# Patient Record
Sex: FEMALE | Race: WHITE | Employment: FULL TIME | URBAN - METROPOLITAN AREA
[De-identification: names, ages, dates, MRNs, and addresses within clinical notes are randomized per-mention and may not be internally consistent; named-entity substitution may affect disease eponyms.]

---

## 2019-08-30 ENCOUNTER — HOSPITAL ENCOUNTER (OUTPATIENT)
Dept: CT IMAGING | Age: 54
Discharge: HOME | End: 2019-08-30
Attending: ORTHOPAEDIC SURGERY
Payer: COMMERCIAL

## 2019-08-30 DIAGNOSIS — S69.91XA RIGHT WRIST INJURY, INITIAL ENCOUNTER: ICD-10-CM

## 2019-08-30 PROCEDURE — 73200 CT UPPER EXTREMITY W/O DYE: CPT

## 2019-12-06 ENCOUNTER — HOSPITAL ENCOUNTER (OUTPATIENT)
Dept: CT IMAGING | Age: 54
Discharge: HOME | End: 2019-12-06
Attending: NURSE PRACTITIONER
Payer: COMMERCIAL

## 2019-12-06 DIAGNOSIS — R93.89 ABNORMAL CXR: ICD-10-CM

## 2019-12-06 PROCEDURE — 71250 CT THORAX DX C-: CPT

## 2020-08-26 ENCOUNTER — OFFICE VISIT (OUTPATIENT)
Dept: CARDIOLOGY CLINIC | Age: 55
End: 2020-08-26
Payer: COMMERCIAL

## 2020-08-26 ENCOUNTER — CLINICAL SUPPORT (OUTPATIENT)
Dept: CARDIOLOGY CLINIC | Age: 55
End: 2020-08-26
Payer: COMMERCIAL

## 2020-08-26 VITALS
BODY MASS INDEX: 32.4 KG/M2 | DIASTOLIC BLOOD PRESSURE: 70 MMHG | HEART RATE: 101 BPM | RESPIRATION RATE: 18 BRPM | HEIGHT: 66 IN | WEIGHT: 201.6 LBS | OXYGEN SATURATION: 98 % | SYSTOLIC BLOOD PRESSURE: 114 MMHG

## 2020-08-26 DIAGNOSIS — I49.8 FLUTTERING HEART: ICD-10-CM

## 2020-08-26 DIAGNOSIS — R00.2 PALPITATIONS: ICD-10-CM

## 2020-08-26 DIAGNOSIS — R06.02 SOB (SHORTNESS OF BREATH): ICD-10-CM

## 2020-08-26 DIAGNOSIS — R00.2 PALPITATIONS: Primary | ICD-10-CM

## 2020-08-26 DIAGNOSIS — R07.9 CHEST PAIN, UNSPECIFIED TYPE: ICD-10-CM

## 2020-08-26 PROCEDURE — 93224 XTRNL ECG REC UP TO 48 HRS: CPT | Performed by: INTERNAL MEDICINE

## 2020-08-26 PROCEDURE — 99204 OFFICE O/P NEW MOD 45 MIN: CPT | Performed by: INTERNAL MEDICINE

## 2020-08-26 PROCEDURE — 93000 ELECTROCARDIOGRAM COMPLETE: CPT | Performed by: INTERNAL MEDICINE

## 2020-08-26 RX ORDER — IBUPROFEN 200 MG
800 TABLET ORAL AS NEEDED
COMMUNITY
End: 2021-08-03 | Stop reason: DRUGHIGH

## 2020-08-26 RX ORDER — ETANERCEPT 50 MG/ML
50 SOLUTION SUBCUTANEOUS
COMMUNITY
Start: 2020-04-24

## 2020-08-26 NOTE — PROGRESS NOTES
[address]  [phone]     Subjective:      Frankey Hoffman is a 62 y.o. female with pmhx RA is here to establish care/referred by pcp for:  C/o daily episodes of palpitation x 3 weeks associated with some sob. She reports intermittent nonexertional cp but last episode was in  11/19. She walks every night for 30 minutes  Around her neighborhood with no cp but does notice heart fluttering. Family hx CAD (maternal side). The patient denies  orthopnea, PND, LE edema, syncope, or presyncope.        Patient Active Problem List    Diagnosis Date Noted    DJD (degenerative joint disease) of knee 05/10/2011      Brayan Lazcano NP  Past Medical History:   Diagnosis Date    ostearthritis     RHA (rheumatoid arthritis) [facility])       Past Surgical History:   Procedure Laterality Date    HX APPENDECTOMY      HX CHOLECYSTECTOMY      1 year ago    HX HYSTERECTOMY      HX ORTHOPAEDIC      bi-lateral pins to knees    HX TONSILLECTOMY      TOTAL KNEE ARTHROPLASTY  5/9/2011    Right     Allergies   Allergen Reactions    Morphine Shortness of Breath      Family History   Problem Relation Age of Onset    Other Mother         tachycardia    Stroke Father     Heart Failure Father 80    Microhematuria Maternal Grandfather 64      Social History     Socioeconomic History    Marital status: SINGLE     Spouse name: Not on file    Number of children: Not on file    Years of education: Not on file    Highest education level: Not on file   Occupational History    Not on file   Social Needs    Financial resource strain: Not on file    Food insecurity     Worry: Not on file     Inability: Not on file    Transportation needs     Medical: Not on file     Non-medical: Not on file   Tobacco Use    Smoking status: Never Smoker    Smokeless tobacco: Never Used   Substance and Sexual Activity    Alcohol use: Yes     Comment: occasionally    Drug use: Not Currently     Types: Prescription, OTC    Sexual activity: Not on file   Lifestyle    Physical activity     Days per week: Not on file     Minutes per session: Not on file    Stress: Not on file   Relationships    Social connections     Talks on phone: Not on file     Gets together: Not on file     Attends Rastafarian service: Not on file     Active member of club or organization: Not on file     Attends meetings of clubs or organizations: Not on file     Relationship status: Not on file    Intimate partner violence     Fear of current or ex partner: Not on file     Emotionally abused: Not on file     Physically abused: Not on file     Forced sexual activity: Not on file   Other Topics Concern    Not on file   Social History Narrative    Not on file      Current Outpatient Medications   Medication Sig    etanercept (EnbreL SureClick) 50 mg/mL (1 mL) injection 50 mg every seven (7) days.  ibuprofen (MOTRIN) 200 mg tablet Take 200 mg by mouth every six (6) hours as needed for Pain.  cyclobenzaprine (FLEXERIL) 10 mg tablet Take 10 mg by mouth nightly. No current facility-administered medications for this visit. Review of Symptoms:  11 systems reviewed, negative other than as stated in the HPI    Physical ExamPhysical Exam:    Vitals:    08/26/20 1434 08/26/20 1435   BP: 100/70 114/70   Pulse: (!) 101    Resp: 18    SpO2: 98%    Weight: 201 lb 9.6 oz (91.4 kg)    Height: 5' 6\" (1.676 m)      Body mass index is 32.54 kg/m². General PE  Gen:  NAD  Mental Status - Alert. General Appearance - Not in acute distress. HEENT:  PERRL, no carotid bruits or JVD  Chest and Lung Exam   Inspection: Accessory muscles - No use of accessory muscles in breathing. Auscultation:   Breath sounds: - Normal.   Cardiovascular   Inspection: Jugular vein - Bilateral - Inspection Normal.   Palpation/Percussion:   Apical Impulse: - Normal.   Auscultation: Rhythm - Regular. Heart Sounds - S1 WNL and S2 WNL.  No S3 or S4.   Murmurs & Other Heart Sounds: Auscultation of the heart reveals - No Murmurs. Peripheral Vascular   Upper Extremity: Inspection - Bilateral - No Cyanotic nailbeds or Digital clubbing. Lower Extremity:   Palpation: Edema - Bilateral - No edema. Abdomen:   Soft, non-tender, bowel sounds are active. Neuro: A&O times 3, CN and motor grossly WNL    Labs:   No results found for: CHOL, CHOLX, CHLST, CHOLV, 788532, HDL, HDLP, LDL, LDLC, DLDLP, TGLX, TRIGL, TRIGP, CHHD, CHHDX  No results found for: CPK, CPKX, CPX  Lab Results   Component Value Date/Time    Sodium 138 05/10/2011 03:30 AM    Potassium 3.8 05/10/2011 03:30 AM    Chloride 107 05/10/2011 03:30 AM    CO2 26 05/10/2011 03:30 AM    Anion gap 5 05/10/2011 03:30 AM    Glucose 169 (H) 05/10/2011 03:30 AM    BUN 10 05/10/2011 03:30 AM    Creatinine 0.8 05/10/2011 03:30 AM    BUN/Creatinine ratio 13 05/10/2011 03:30 AM    GFR est AA >60 05/10/2011 03:30 AM    GFR est non-AA >60 05/10/2011 03:30 AM    Calcium 7.6 (L) 05/10/2011 03:30 AM    Bilirubin, total 0.3 2010 09:40 AM    Alk. phosphatase 79 2010 09:40 AM    Protein, total 6.9 2010 09:40 AM    Albumin 3.3 (L) 2010 09:40 AM    Globulin 3.6 2010 09:40 AM    A-G Ratio 0.9 (L) 2010 09:40 AM    ALT (SGPT) 49 2010 09:40 AM       EKG:  NSR      Assessment:     Assessment:      1. Palpitations    2. SOB (shortness of breath)    3. Fluttering heart        Orders Placed This Encounter    AMB POC EKG ROUTINE W/ 12 LEADS, INTER & REP     Order Specific Question:   Reason for Exam:     Answer:   routine    etanercept (EnbreL SureClick) 50 mg/mL (1 mL) injection     Si mg every seven (7) days.  ibuprofen (MOTRIN) 200 mg tablet     Sig: Take 200 mg by mouth every six (6) hours as needed for Pain.         Plan:       Palpitation with some sob when it occurs  Normal TSH    Obtain 24 hr holter, echo    Bp controlled    Hx atypical cp last episode was in , occurred while sitting down and lasted about 10 minutes and went away  Check Stress ekg   Due to risk factors of age, and rheumatologic disease, if stress test normal, Coronary calcium scoring would be reassuring if totally normal and threshold for further testing/treatment would be decreased if high- the patient wishes to proceed (he will call [address] [place] [address] to schedule) and will call for my input on results after testing is completed. RA  On biologic therapy, followed by Dr Kiki Jaimes      Follow-up to be determined based on test results.          Ana Abrams MD

## 2020-08-26 NOTE — LETTER
8/26/20 Patient: Viola Chavez YOB: 1965 Date of Visit: 8/26/2020 Sharmin Galo NP 
[facility] [address] [address] VIA Facsimile: [phone] Dear Sharmin Galo NP, Thank you for referring Ms. Irving Chadwick to [facility] for evaluation. My notes for this consultation are attached. If you have questions, please do not hesitate to call me. I look forward to following your patient along with you.  
 
 
Sincerely, 
 
Rut Grant MD

## 2020-08-26 NOTE — PROGRESS NOTES
Chief Complaint   Patient presents with    New Patient     Referred by pcp for palpitations     Chest Pain     started in 2/20 3 diff times - has not had since     Irregular Heart Beat     has been having flutters for the past 2 weeks - Dad had CHF and Mom has tachycardia     Shortness of Breath     with flutters     Leg Swelling     left lower leg - does have issue with left knee      1. Have you been to the ER, urgent care clinic since your last visit? Hospitalized since your last visit? No     2. Have you seen or consulted any other health care providers outside of the [address] [place] [address] since your last visit? Include any pap smears or colon screening.   No

## 2020-08-27 DIAGNOSIS — R00.2 PALPITATIONS: ICD-10-CM

## 2020-08-27 DIAGNOSIS — R06.02 SOB (SHORTNESS OF BREATH): ICD-10-CM

## 2020-08-27 DIAGNOSIS — I49.8 FLUTTERING HEART: ICD-10-CM

## 2020-08-28 RX ORDER — METOPROLOL SUCCINATE 25 MG/1
12.5 TABLET, EXTENDED RELEASE ORAL DAILY
Qty: 60 TAB | Refills: 1 | Status: SHIPPED | OUTPATIENT
Start: 2020-08-28 | End: 2021-02-07

## 2020-08-28 NOTE — TELEPHONE ENCOUNTER
Verified patient with two identifiers. Pt informed that holter monitor findings revealed some A-fib with rapid rates. She understands what A-fib is. Due to this finding Rayshawn Lau, NP would like her to start Toprol 12.5 mg . She will take this at bedtime. Monitor BP and symptoms. She will keep her echo and stress appts and follow up with Dr Phillip Gudino in 1 month. Pt verbalized understanding.

## 2020-08-31 ENCOUNTER — HOSPITAL ENCOUNTER (OUTPATIENT)
Dept: PREADMISSION TESTING | Age: 55
Discharge: HOME | End: 2020-08-31
Payer: COMMERCIAL

## 2020-08-31 PROCEDURE — 87635 SARS-COV-2 COVID-19 AMP PRB: CPT

## 2020-09-01 LAB
HEALTH STATUS, XMCV2T: NORMAL
SARS-COV-2, COV2NT: NOT DETECTED
SOURCE, COVRS: NORMAL
SPECIMEN SOURCE, FCOV2M: NORMAL
SPECIMEN TYPE, XMCV1T: NORMAL

## 2020-09-03 ENCOUNTER — TELEPHONE (OUTPATIENT)
Dept: CARDIOLOGY CLINIC | Age: 55
End: 2020-09-03

## 2020-09-03 NOTE — TELEPHONE ENCOUNTER
Dario Leavitt- noted chads vascular score is 1. Please advise to also take aspirin 81 mg daily, added to her medication list once she confirms this. If blood pressure is above 95 and she is not lightheaded, she can also try up to 25 mg of Toprol. Follow-up as scheduled.

## 2020-09-04 ENCOUNTER — HOSPITAL ENCOUNTER (OUTPATIENT)
Dept: NON INVASIVE DIAGNOSTICS | Age: 55
Discharge: HOME | End: 2020-09-04
Attending: INTERNAL MEDICINE

## 2020-09-04 ENCOUNTER — TELEPHONE (OUTPATIENT)
Dept: CARDIOLOGY CLINIC | Age: 55
End: 2020-09-04

## 2020-09-04 DIAGNOSIS — E78.2 MIXED HYPERLIPIDEMIA: ICD-10-CM

## 2020-09-04 DIAGNOSIS — E78.2 MIXED HYPERLIPIDEMIA: Primary | ICD-10-CM

## 2020-09-04 DIAGNOSIS — R06.09 DOE (DYSPNEA ON EXERTION): ICD-10-CM

## 2020-09-04 LAB
STRESS ANGINA INDEX: 0
STRESS BASELINE DIAS BP: 74 MMHG
STRESS BASELINE HR: 80 BPM
STRESS BASELINE SYS BP: 108 MMHG
STRESS ESTIMATED WORKLOAD: 7 METS
STRESS EXERCISE DUR MIN: NORMAL
STRESS O2 SAT PEAK: 98 %
STRESS O2 SAT REST: 96 %
STRESS PEAK DIAS BP: 76 MMHG
STRESS PEAK SYS BP: 140 MMHG
STRESS PERCENT HR ACHIEVED: 93 %
STRESS POST PEAK HR: 153 BPM
STRESS RATE PRESSURE PRODUCT: NORMAL BPM*MMHG
STRESS SR DUKE TREADMILL SCORE: 0
STRESS ST DEPRESSION: 0 MM
STRESS ST ELEVATION: 0 MM
STRESS TARGET HR: 164 BPM

## 2020-09-04 RX ORDER — BISMUTH SUBSALICYLATE 262 MG
1 TABLET,CHEWABLE ORAL DAILY
COMMUNITY
End: 2021-08-03

## 2020-09-04 RX ORDER — MELATONIN 5 MG
5 CAPSULE ORAL
COMMUNITY

## 2020-09-04 NOTE — TELEPHONE ENCOUNTER
----- Message from Tanvi Villasenor NP sent at 9/4/2020  1:28 PM EDT -----  Stress test is normal. May proceed with CAC score if she wants to.  I will order if in agreement

## 2020-09-11 ENCOUNTER — TELEPHONE (OUTPATIENT)
Dept: CARDIOLOGY CLINIC | Age: 55
End: 2020-09-11

## 2020-09-16 ENCOUNTER — ANCILLARY PROCEDURE (OUTPATIENT)
Dept: CARDIOLOGY CLINIC | Age: 55
End: 2020-09-16
Payer: COMMERCIAL

## 2020-09-16 VITALS
DIASTOLIC BLOOD PRESSURE: 70 MMHG | BODY MASS INDEX: 32.3 KG/M2 | SYSTOLIC BLOOD PRESSURE: 114 MMHG | WEIGHT: 201 LBS | HEIGHT: 66 IN

## 2020-09-16 PROCEDURE — 93306 TTE W/DOPPLER COMPLETE: CPT | Performed by: INTERNAL MEDICINE

## 2020-09-17 LAB
ECHO AO ASC DIAM: 2.82 CM
ECHO AO ROOT DIAM: 2.99 CM
ECHO AV AREA PEAK VELOCITY: 1.99 CM2
ECHO AV AREA PEAK VELOCITY: 2.06 CM2
ECHO AV PEAK GRADIENT: 4.98 MMHG
ECHO AV PEAK VELOCITY: 111.53 CM/S
ECHO LA AREA 4C: 18.27 CM2
ECHO LA MAJOR AXIS: 2.82 CM
ECHO LA MINOR AXIS: 1.41 CM
ECHO LA VOL 2C: 46.91 ML (ref 22–52)
ECHO LA VOL 4C: 48.98 ML (ref 22–52)
ECHO LA VOL BP: 51.02 ML (ref 22–52)
ECHO LA VOL/BSA BIPLANE: 25.45 ML/M2 (ref 16–28)
ECHO LA VOLUME INDEX A2C: 23.4 ML/M2 (ref 16–28)
ECHO LA VOLUME INDEX A4C: 24.44 ML/M2 (ref 16–28)
ECHO LV E' LATERAL VELOCITY: 9.76 CM/S
ECHO LV E' SEPTAL VELOCITY: 7.19 CM/S
ECHO LV INTERNAL DIMENSION DIASTOLIC: 5.12 CM (ref 3.9–5.3)
ECHO LV INTERNAL DIMENSION SYSTOLIC: 3.61 CM
ECHO LV IVSD: 0.9 CM (ref 0.6–0.9)
ECHO LV MASS 2D: 169.4 G (ref 67–162)
ECHO LV MASS INDEX 2D: 84.5 G/M2 (ref 43–95)
ECHO LV POSTERIOR WALL DIASTOLIC: 0.94 CM (ref 0.6–0.9)
ECHO LVOT DIAM: 2.03 CM
ECHO LVOT PEAK GRADIENT: 1.87 MMHG
ECHO LVOT PEAK GRADIENT: 2.01 MMHG
ECHO LVOT PEAK VELOCITY: 68.44 CM/S
ECHO LVOT PEAK VELOCITY: 70.95 CM/S
ECHO LVOT SV: 59.1 ML
ECHO LVOT VTI: 18.25 CM
ECHO MV A VELOCITY: 60.61 CM/S
ECHO MV AREA PHT: 4.12 CM2
ECHO MV E DECELERATION TIME (DT): 0.18 S
ECHO MV E VELOCITY: 80.76 CM/S
ECHO MV E/A RATIO: 1.33
ECHO MV E/E' LATERAL: 8.27
ECHO MV E/E' RATIO (AVERAGED): 9.75
ECHO MV E/E' SEPTAL: 11.23
ECHO MV PRESSURE HALF TIME (PHT): 0.05 S
ECHO RV TAPSE: 2.05 CM (ref 1.5–2)

## 2020-09-21 NOTE — TELEPHONE ENCOUNTER
----- Message from Monica Long NP sent at 9/21/2020 11:41 AM EDT -----  Normal squeezing function of the heart, mild leakage in mitral valve. Continue good BP control.

## 2020-09-30 ENCOUNTER — OFFICE VISIT (OUTPATIENT)
Dept: CARDIOLOGY CLINIC | Age: 55
End: 2020-09-30
Payer: COMMERCIAL

## 2020-09-30 VITALS
DIASTOLIC BLOOD PRESSURE: 74 MMHG | RESPIRATION RATE: 16 BRPM | OXYGEN SATURATION: 100 % | WEIGHT: 194.9 LBS | HEART RATE: 60 BPM | BODY MASS INDEX: 31.32 KG/M2 | HEIGHT: 66 IN | SYSTOLIC BLOOD PRESSURE: 110 MMHG

## 2020-09-30 DIAGNOSIS — I48.0 PAF (PAROXYSMAL ATRIAL FIBRILLATION) (HCC): Primary | ICD-10-CM

## 2020-09-30 DIAGNOSIS — I49.8 FLUTTERING HEART: ICD-10-CM

## 2020-09-30 DIAGNOSIS — R06.02 SOB (SHORTNESS OF BREATH): ICD-10-CM

## 2020-09-30 DIAGNOSIS — R00.2 PALPITATIONS: ICD-10-CM

## 2020-09-30 PROCEDURE — 93000 ELECTROCARDIOGRAM COMPLETE: CPT | Performed by: INTERNAL MEDICINE

## 2020-09-30 PROCEDURE — 99214 OFFICE O/P EST MOD 30 MIN: CPT | Performed by: INTERNAL MEDICINE

## 2020-09-30 RX ORDER — GUAIFENESIN 100 MG/5ML
81 LIQUID (ML) ORAL DAILY
COMMUNITY
End: 2021-08-03

## 2020-09-30 NOTE — LETTER
9/30/20 Patient: Fabby Reid YOB: 1965 Date of Visit: 9/30/2020 Beth Cardoza NP 
[facility] [address] [address] VIA Facsimile: [phone] Dear Beth Cardoza NP, Thank you for referring Ms. Darius Coulter to [address] [place] [address] [facility] for evaluation. My notes for this consultation are attached. If you have questions, please do not hesitate to call me. I look forward to following your patient along with you.  
 
 
Sincerely, 
 
Valente Hilton MD

## 2020-09-30 NOTE — PROGRESS NOTES
1. Have you been to the ER, urgent care clinic since your last visit? Hospitalized since your last visit? No.    2. Have you seen or consulted any other health care providers outside of the [address] [place] [address] since your last visit? Include any pap smears or colon screening.    No.      Chief Complaint   Patient presents with    Follow-up     1 month- some heart palps, sob is better    Results     discuss test results

## 2020-09-30 NOTE — PROGRESS NOTES
[address]  [phone]     Subjective:      Arin Mathis is a 62 y.o. female is here for one mos f/u. Newly diagnosed with PAF, seen per holter monitor we ordered last month. Started on ASA and BB. Today, feels great. No further palpitation / sob / cp. Other cardiac work up -ve. The patient denies chest pain/ shortness of breath, orthopnea, PND, LE edema, palpitations, syncope, or presyncope.        Patient Active Problem List    Diagnosis Date Noted    DJD (degenerative joint disease) of knee 05/10/2011      Yue Mcnamara NP  Past Medical History:   Diagnosis Date    ostearthritis     [facility]umatoid [facility]) [facility])       Past Surgical History:   Procedure Laterality Date    HX APPENDECTOMY      HX CHOLECYSTECTOMY      1 year ago    HX HYSTERECTOMY      HX ORTHOPAEDIC      bi-lateral pins to knees    HX TONSILLECTOMY      TOTAL KNEE ARTHROPLASTY  5/9/2011    Right     Allergies   Allergen Reactions    Morphine Shortness of Breath      Family History   Problem Relation Age of Onset    Other Mother         tachycardia    Stroke Father     Heart Failure Father 80    Microhematuria Maternal Grandfather 64      Social History     Socioeconomic History    Marital status: SINGLE     Spouse name: Not on file    Number of children: Not on file    Years of education: Not on file    Highest education level: Not on file   Occupational History    Not on file   Social Needs    Financial resource strain: Not on file    Food insecurity     Worry: Not on file     Inability: Not on file    Transportation needs     Medical: Not on file     Non-medical: Not on file   Tobacco Use    Smoking status: Never Smoker    Smokeless tobacco: Never Used   Substance and Sexual Activity    Alcohol use: Yes     Comment: occasionally    Drug use: Not Currently     Types: Prescription, OTC    Sexual activity: Not on file   Lifestyle    Physical activity     Days per week: Not on file     Minutes per session: Not on file    Stress: Not on file   Relationships    Social connections     Talks on phone: Not on file     Gets together: Not on file     Attends Moravian service: Not on file     Active member of club or organization: Not on file     Attends meetings of clubs or organizations: Not on file     Relationship status: Not on file    Intimate partner violence     Fear of current or ex partner: Not on file     Emotionally abused: Not on file     Physically abused: Not on file     Forced sexual activity: Not on file   Other Topics Concern    Not on file   Social History Narrative    Not on file      Current Outpatient Medications   Medication Sig    aspirin 81 mg chewable tablet Take 81 mg by mouth daily.  OTHER,NON-FORMULARY, Take 100 mg by mouth daily. Potassium 100mg daily.  multivitamin (ONE A DAY) tablet Take 1 Tab by mouth daily.  OTHER,NON-FORMULARY, Take 1 Tab by mouth daily. Omega 3 1 tablet daily    OTHER,NON-FORMULARY, Take 1 Tab by mouth daily. Calcium Magnesium tablet daily.  melatonin 5 mg cap capsule Take 5 mg by mouth nightly as needed (for sleep aid).  metoprolol succinate (TOPROL-XL) 25 mg XL tablet Take 0.5 Tabs by mouth daily.  etanercept (EnbreL SureClick) 50 mg/mL (1 mL) injection 50 mg every seven (7) days.  ibuprofen (MOTRIN) 200 mg tablet Take 200 mg by mouth every six (6) hours as needed for Pain.  cyclobenzaprine (FLEXERIL) 10 mg tablet Take 10 mg by mouth nightly. No current facility-administered medications for this visit. Review of Symptoms:  11 systems reviewed, negative other than as stated in the HPI    Physical ExamPhysical Exam:    Vitals:    09/30/20 1505 09/30/20 1510   BP: 128/78 110/74   Pulse: 60    Resp: 16    SpO2: 100%    Weight: 194 lb 14.4 oz (88.4 kg)    Height: 5' 6\" (1.676 m)      Body mass index is 31.46 kg/m². General PE  Gen:  NAD  Mental Status - Alert.  General Appearance - Not in acute distress. HEENT:  PERRL, no carotid bruits or JVD  Chest and Lung Exam   Inspection: Accessory muscles - No use of accessory muscles in breathing. Auscultation:   Breath sounds: - Normal.   Cardiovascular   Inspection: Jugular vein - Bilateral - Inspection Normal.   Palpation/Percussion:   Apical Impulse: - Normal.   Auscultation: Rhythm - Regular. Heart Sounds - S1 WNL and S2 WNL. No S3 or S4. Murmurs & Other Heart Sounds: Auscultation of the heart reveals - No Murmurs. Peripheral Vascular   Upper Extremity: Inspection - Bilateral - No Cyanotic nailbeds or Digital clubbing. Lower Extremity:   Palpation: Edema - Bilateral - No edema. Abdomen:   Soft, non-tender, bowel sounds are active. Neuro: A&O times 3, CN and motor grossly WNL    Labs:   No results found for: CHOL, CHOLX, CHLST, CHOLV, 231805, HDL, HDLP, LDL, LDLC, DLDLP, TGLX, TRIGL, TRIGP, CHHD, CHHDX  No results found for: CPK, CPKX, CPX  Lab Results   Component Value Date/Time    Sodium 138 05/10/2011 03:30 AM    Potassium 3.8 05/10/2011 03:30 AM    Chloride 107 05/10/2011 03:30 AM    CO2 26 05/10/2011 03:30 AM    Anion gap 5 05/10/2011 03:30 AM    Glucose 169 (H) 05/10/2011 03:30 AM    BUN 10 05/10/2011 03:30 AM    Creatinine 0.8 05/10/2011 03:30 AM    BUN/Creatinine ratio 13 05/10/2011 03:30 AM    GFR est AA >60 05/10/2011 03:30 AM    GFR est non-AA >60 05/10/2011 03:30 AM    Calcium 7.6 (L) 05/10/2011 03:30 AM    Bilirubin, total 0.3 03/03/2010 09:40 AM    Alk. phosphatase 79 03/03/2010 09:40 AM    Protein, total 6.9 03/03/2010 09:40 AM    Albumin 3.3 (L) 03/03/2010 09:40 AM    Globulin 3.6 03/03/2010 09:40 AM    A-G Ratio 0.9 (L) 03/03/2010 09:40 AM    ALT (SGPT) 49 03/03/2010 09:40 AM       EKG:  NSR     Assessment:        1. PAF (paroxysmal atrial fibrillation) (HCC)    2. Palpitations    3. SOB (shortness of breath)    4.  Fluttering heart        Orders Placed This Encounter    AMB POC EKG ROUTINE W/ 12 LEADS, INTER & REP     Order Specific Question:   Reason for Exam:     Answer:   routine    aspirin 81 mg chewable tablet     Sig: Take 81 mg by mouth daily. Plan:     PAF, newly diagnosed  24 hr holter 8/2020: Palpitations correspond to runs of paroxysmal atrial fibrillation. Normal TSH 11/19   Normal EF no significant valve issues per echo in 9/2020  Continue low dose BB, ASA    Atrial fibrillation CHADSVASC2 score stroke risk:   62 y.o.                                        <65        + 0   female                                         Female +1  CHF hx                                           No    + 0  HTN hx                                           No    + 0  Stroke/TIA/Thromboembolism       No    +0  Vascular disease hx                       No    + 0  Diabetes Mellitus                            No    + 0   CHADSVASC2 score                    1      Annual Stroke Risk 0.6% - low-moderate risk          Bp controlled     Hx atypical cp last episode was in 11/19, occurred while sitting down and lasted about 10 minutes and went away  Normal stress test 9/2020  Defers CAC score     RA  On biologic therapy, followed by Dr Norah Dotson to establish care with a PCP     Counseled on diet and exercise- eventual goal of 30-60 minutes 5-7 times a week as per AHA guidelines.       Continue current care and f/u in 1 year, sooner SHAYLEE Grant MD

## 2021-05-18 ENCOUNTER — TRANSCRIBE ORDER (OUTPATIENT)
Dept: SCHEDULING | Age: 56
End: 2021-05-18

## 2021-05-18 DIAGNOSIS — M17.12 DEGENERATIVE ARTHRITIS OF LEFT KNEE: Primary | ICD-10-CM

## 2021-06-14 ENCOUNTER — PATIENT MESSAGE (OUTPATIENT)
Dept: CARDIOLOGY CLINIC | Age: 56
End: 2021-06-14

## 2021-06-15 ENCOUNTER — TELEPHONE (OUTPATIENT)
Dept: CARDIOLOGY CLINIC | Age: 56
End: 2021-06-15

## 2021-06-15 RX ORDER — METOPROLOL SUCCINATE 25 MG/1
25 TABLET, EXTENDED RELEASE ORAL DAILY
Qty: 90 TABLET | Refills: 1 | Status: SHIPPED | OUTPATIENT
Start: 2021-06-15 | End: 2021-11-12 | Stop reason: SDUPTHER

## 2021-06-15 NOTE — TELEPHONE ENCOUNTER
Patient needs refill on medication. Medication is metoprolol, at [id number]. Patient needs new prescription showing changes sent over to the pharmacy. [phone].      Thanks,  Griselda Garcia

## 2021-06-29 ENCOUNTER — OFFICE VISIT (OUTPATIENT)
Dept: CARDIOLOGY CLINIC | Age: 56
End: 2021-06-29
Payer: COMMERCIAL

## 2021-06-29 VITALS
RESPIRATION RATE: 18 BRPM | HEART RATE: 79 BPM | OXYGEN SATURATION: 98 % | DIASTOLIC BLOOD PRESSURE: 68 MMHG | WEIGHT: 205 LBS | SYSTOLIC BLOOD PRESSURE: 122 MMHG | BODY MASS INDEX: 32.95 KG/M2 | HEIGHT: 66 IN

## 2021-06-29 DIAGNOSIS — R06.02 SOB (SHORTNESS OF BREATH): ICD-10-CM

## 2021-06-29 DIAGNOSIS — R42 LIGHTHEADEDNESS: ICD-10-CM

## 2021-06-29 DIAGNOSIS — I48.0 PAF (PAROXYSMAL ATRIAL FIBRILLATION) (HCC): Primary | ICD-10-CM

## 2021-06-29 DIAGNOSIS — M06.9 RHEUMATOID ARTHRITIS, INVOLVING UNSPECIFIED SITE, UNSPECIFIED WHETHER RHEUMATOID FACTOR PRESENT (HCC): ICD-10-CM

## 2021-06-29 PROCEDURE — 93000 ELECTROCARDIOGRAM COMPLETE: CPT | Performed by: INTERNAL MEDICINE

## 2021-06-29 PROCEDURE — 99214 OFFICE O/P EST MOD 30 MIN: CPT | Performed by: INTERNAL MEDICINE

## 2021-06-29 RX ORDER — PREDNISONE 5 MG/1
10 TABLET ORAL AS NEEDED
COMMUNITY
Start: 2021-06-17 | End: 2021-11-12

## 2021-06-29 RX ORDER — FOLIC ACID 1 MG/1
1 TABLET ORAL DAILY
COMMUNITY
Start: 2021-02-10

## 2021-06-29 RX ORDER — METHOTREXATE 2.5 MG/1
2.5 TABLET ORAL
COMMUNITY
Start: 2021-06-19

## 2021-06-29 RX ORDER — CLOBETASOL PROPIONATE 0.46 MG/ML
SOLUTION TOPICAL
COMMUNITY
Start: 2021-06-04 | End: 2021-08-03

## 2021-06-29 RX ORDER — LEUCOVORIN CALCIUM 5 MG/1
TABLET ORAL
COMMUNITY
Start: 2021-02-10 | End: 2021-08-03

## 2021-06-29 NOTE — PROGRESS NOTES
1. Have you been to the ER, urgent care clinic since your last visit? Hospitalized since your last visit? No    2. Have you seen or consulted any other health care providers outside of the [address] [place] [address] since your last visit? Include any pap smears or colon screening.  No           Chief Complaint   Patient presents with    Irregular Heart Beat     C/O SOB, Bilateral leg and ankle swelling

## 2021-06-29 NOTE — PROGRESS NOTES
[address] [facility]  [phone]     Subjective:      Eugene Yost is a 60 y.o. female is here for a f/u and to have cardiac clearance for left knee replacement with Dr Micheal Noel in August. Hx of PAF, RA. She reports within the past 2 months she has had worsening MEDEROS, episodes of flip flop heart beat sensations, and feeling lightheaded. She called our office about a month ago and reported these symptoms. We increased her Metoprolol to 25mg daily. Since then the symptoms have improved. No more lightheadedness, palpitations are better. Still having some MEDEROS. She has some swelling primarily in her left leg when up for a long time standing. Thinks it is related to needing her knee surgery. She denies chest pain, orthopnea, PND.     Patient Active Problem List    Diagnosis Date Noted    DJD (degenerative joint disease) of knee 05/10/2011      Ramesh Kidd NP  Past Medical History:   Diagnosis Date    ostearthritis     RHA (rheumatoid arthritis) ([facility] [address]       Past Surgical History:   Procedure Laterality Date    HX APPENDECTOMY      HX CHOLECYSTECTOMY      1 year ago    HX HYSTERECTOMY      HX ORTHOPAEDIC      bi-lateral pins to knees    HX TONSILLECTOMY      OH TOTAL KNEE ARTHROPLASTY  5/9/2011    Right     Allergies   Allergen Reactions    Morphine Shortness of Breath      Family History   Problem Relation Age of Onset    Other Mother         tachycardia    Stroke Father     Heart Failure Father 80    Microhematuria Maternal Grandfather 64      Social History     Socioeconomic History    Marital status: SINGLE     Spouse name: Not on file    Number of children: Not on file    Years of education: Not on file    Highest education level: Not on file   Occupational History    Not on file   Tobacco Use    Smoking status: Never Smoker    Smokeless tobacco: Never Used   Substance and Sexual Activity    Alcohol use: Yes     Comment: occasionally    Drug use: Not Currently     Types: Prescription, OTC    Sexual activity: Not on file   Other Topics Concern    Not on file   Social History Narrative    Not on file     Social Determinants of Health     Financial Resource Strain:     Difficulty of Paying Living Expenses:    Food Insecurity:     Worried About Running Out of Food in the Last Year:     [address] in the Last Year:    Transportation Needs:     Lack of Transportation (Medical):  Lack of Transportation (Non-Medical):    Physical Activity:     Days of Exercise per Week:     Minutes of Exercise per Session:    Stress:     Feeling of Stress :    Social Connections:     Frequency of Communication with Friends and Family:     Frequency of Social Gatherings with Friends and Family:     Attends [employer] Services:     Active Member of Clubs or Organizations:     Attends Club or Organization Meetings:     Marital Status:    Intimate Partner Violence:     Fear of Current or Ex-Partner:     Emotionally Abused:     Physically Abused:     Sexually Abused:       Current Outpatient Medications   Medication Sig    clobetasoL (TEMOVATE) 0.05 % external solution APPLY TO SCALP AT BEDTIME, WASH HANDS AFTER APPLICATION    folic acid (FOLVITE) 1 mg tablet Take 1,000 mcg by mouth daily.  methotrexate (RHEUMATREX) 2.5 mg tablet Take 2.5 mg by mouth every Wednesday.  predniSONE (DELTASONE) 5 mg tablet Take 5 mg by mouth daily.  leucovorin calcium (WELLCOVORIN) 5 mg tablet TAKE 1 TABLET BY MOUTH ONCE A WEEK 8 HRS AFTER METHOTREXATE DOSE    metoprolol succinate (TOPROL-XL) 25 mg XL tablet Take 1 Tablet by mouth daily.  aspirin 81 mg chewable tablet Take 81 mg by mouth daily.  multivitamin (ONE A DAY) tablet Take 1 Tab by mouth daily.  melatonin 5 mg cap capsule Take 5 mg by mouth nightly as needed (for sleep aid).  etanercept (EnbreL SureClick) 50 mg/mL (1 mL) injection 50 mg every seven (7) days.     ibuprofen (MOTRIN) 200 mg tablet Take 800 mg by mouth as needed for Pain.  cyclobenzaprine (FLEXERIL) 10 mg tablet Take 10 mg by mouth nightly.  OTHER,NON-FORMULARY, Take 100 mg by mouth daily. Potassium 100mg daily. (Patient not taking: Reported on 6/29/2021)    OTHER,NON-FORMULARY, Take 1 Tab by mouth daily. Omega 3 1 tablet daily (Patient not taking: Reported on 6/29/2021)    OTHER,NON-FORMULARY, Take 1 Tab by mouth daily. Calcium Magnesium tablet daily. (Patient not taking: Reported on 6/29/2021)     No current facility-administered medications for this visit. Review of Symptoms:  11 systems reviewed, negative other than as stated in the HPI    Physical ExamPhysical Exam:    Vitals:    06/29/21 1501   BP: 122/68   Pulse: 79   Resp: 18   SpO2: 98%   Weight: 205 lb (93 kg)   Height: 5' 6\" (1.676 m)     Body mass index is 33.09 kg/m². General PE  Gen:  NAD  Mental Status - Alert. General Appearance - Not in acute distress. HEENT:  PERRL, no carotid bruits or JVD  Chest and Lung Exam   Inspection: Accessory muscles - No use of accessory muscles in breathing. Auscultation:   Breath sounds: - Normal.   Cardiovascular   Inspection: Jugular vein - Bilateral - Inspection Normal.   Palpation/Percussion:   Apical Impulse: - Normal.   Auscultation: Rhythm - Regular. Heart Sounds - S1 WNL and S2 WNL. No S3 or S4. Murmurs & Other Heart Sounds: Auscultation of the heart reveals - No Murmurs. Peripheral Vascular   Upper Extremity: Inspection - Bilateral - No Cyanotic nailbeds or Digital clubbing. Lower Extremity:   Palpation: Edema - Bilateral - No edema. Abdomen:   Soft, non-tender, bowel sounds are active.   Neuro: A&O times 3, CN and motor grossly WNL    Labs:   No results found for: CHOL, CHOLX, CHLST, CHOLV, 073189, HDL, HDLP, LDL, LDLC, DLDLP, TGLX, TRIGL, TRIGP, CHHD, CHHDX  No results found for: CPK, CPKX, CPX  Lab Results   Component Value Date/Time    Sodium 138 05/10/2011 03:30 AM    Potassium 3.8 05/10/2011 03:30 AM    Chloride 107 05/10/2011 03:30 AM    CO2 26 05/10/2011 03:30 AM    Anion gap 5 05/10/2011 03:30 AM    Glucose 169 (H) 05/10/2011 03:30 AM    BUN 10 05/10/2011 03:30 AM    Creatinine 0.8 05/10/2011 03:30 AM    BUN/Creatinine ratio 13 05/10/2011 03:30 AM    GFR est AA >60 05/10/2011 03:30 AM    GFR est non-AA >60 05/10/2011 03:30 AM    Calcium 7.6 (L) 05/10/2011 03:30 AM    Bilirubin, total 0.3 03/03/2010 09:40 AM    Alk. phosphatase 79 03/03/2010 09:40 AM    Protein, total 6.9 03/03/2010 09:40 AM    Albumin 3.3 (L) 03/03/2010 09:40 AM    Globulin 3.6 03/03/2010 09:40 AM    A-G Ratio 0.9 (L) 03/03/2010 09:40 AM    ALT (SGPT) 49 03/03/2010 09:40 AM       EKG: SR, HR 79     Assessment:        1. PAF (paroxysmal atrial fibrillation) ([facility] [address]    2. SOB (shortness of breath)        Orders Placed This Encounter    AMB POC EKG ROUTINE W/ 12 LEADS, INTER & REP     Order Specific Question:   Reason for Exam:     Answer:   ROUTINE    clobetasoL (TEMOVATE) 0.05 % external solution     Sig: APPLY TO SCALP AT BEDTIME, WASH HANDS AFTER APPLICATION    folic acid (FOLVITE) 1 mg tablet     Sig: Take 1,000 mcg by mouth daily.  methotrexate (RHEUMATREX) 2.5 mg tablet     Sig: Take 2.5 mg by mouth every Wednesday.  predniSONE (DELTASONE) 5 mg tablet     Sig: Take 5 mg by mouth daily.  leucovorin calcium (WELLCOVORIN) 5 mg tablet     Sig: TAKE 1 TABLET BY MOUTH ONCE A WEEK 8 HRS AFTER METHOTREXATE DOSE        Plan:     PAF  24 hr holter 8/2020: Palpitations corresponded to relatively frequent runs of atrial fibrillation, at times with RVR. Started on Metoprolol 12.5mg daily and ASA. She reports about 2 months ago she had onset of MEDEROS, increased palpitations, and lightheadedness. We increased her Metoprolol 25mg which has improved her symptoms. Still some MEDEROS, palpitations are less frequent. EKG SR. Labs completed by rheumatology about 6 weeks ago within normal range per pt.  Normal EF without significant valvular issues per echo in 9/2020. CHADs vasc 2 score of 1. Obtain 2 week event monitor and echo as she is on medications that increase risk for heart failure for her RA. Continue BB, ASA       BP today 122/68. Well controlled.     Hx atypical cp last episode was in 11/19, occurred while sitting down and lasted about 10 minutes and went away  Normal stress test 9/2020. Denies further CP. No fam hx of CAD/MI. Defers CAC score     RA  On biologic therapy, followed by Dr Nishi Mirza     Cardiac clearance for left TKR: will obtain testing prior to clearance. F/U dependent on results. Lily Oppenheim, NP    Patient seen and examined by me with the above nurse practitioner. I personally performed all components of the history, physical, and medical decision making and agree with the assessment and plan with minor modifications as noted. Today the patient presents with recurrent palpitations despite increased metoprolol dose. General PE  Gen:  NAD  Mental Status - Alert. General Appearance - Not in acute distress. HEENT:  PERRL, no carotid bruits or JVD  Chest and Lung Exam   Inspection: Accessory muscles - No use of accessory muscles in breathing. Auscultation:   Breath sounds: - Normal.   Cardiovascular   Inspection: Jugular vein - Bilateral - Inspection Normal.   Palpation/Percussion:   Apical Impulse: - Normal.   Auscultation: Rhythm - Regular. Heart Sounds - S1 WNL and S2 WNL. No S3 or S4. Murmurs & Other Heart Sounds: Auscultation of the heart reveals - No Murmurs. Peripheral Vascular   Upper Extremity: Inspection - Bilateral - No Cyanotic nailbeds or Digital clubbing. Lower Extremity:   Palpation: Edema - Bilateral - No edema. Abdomen:   Soft, non-tender, bowel sounds are active. Neuro: A&O times 3, CN and motor grossly WNL    Check echo and 2-week event monitor. If significant recurrence of atrial fibrillation, discussed options of trying flecainide and/or referral to Dr. Sandoval Claudio to consider ablation. She thinks she would prefer to try antiarrhythmic first.  Follow-up to be determined based on results.

## 2021-06-29 NOTE — LETTER
6/29/2021    Patient: Riccardo Estevez   YOB: 1965   Date of Visit: 6/29/2021     Ru Pearce NP  [address]  [address] [address]  Via Fax: [phone]    Dear uR Pearce NP,      Thank you for referring Ms. Ирина Tobin to [facility] for evaluation. My notes for this consultation are attached. If you have questions, please do not hesitate to call me. I look forward to following your patient along with you.       Sincerely,    Subha Alba MD

## 2021-07-02 ENCOUNTER — TELEPHONE (OUTPATIENT)
Dept: CARDIOLOGY CLINIC | Age: 56
End: 2021-07-02

## 2021-07-02 NOTE — TELEPHONE ENCOUNTER
Verified patient with 2 identifiers   Advised per Giacomo Daniels NP, Would call PCP to discuss symptoms -- sounds like a migraine.  Can try taking tylenol & ibuprofen alternating. If BP > 180/90, then should go to the ER. Patient verified understanding and does have a call in to her pcp.

## 2021-07-02 NOTE — TELEPHONE ENCOUNTER
Verified patient with 2 identifiers   Patient states she has had a severe headache since Wednesday  She has been taking Ibuprofen 800 mg three times daily  Each dose helps for three hours and the headache comes right back  Patient was seen here on Tuesday but no med changes. Patient states she has not done anything different. Please advise.

## 2021-07-02 NOTE — TELEPHONE ENCOUNTER
Patient called in regards to having a lot of headaches recently. Patient has had a solid headache since Wednesday morning and it has yet to go away. Please call and advise. [phone].     Thanks,  Jerrod Dill

## 2021-07-06 ENCOUNTER — CLINICAL SUPPORT (OUTPATIENT)
Dept: CARDIOLOGY CLINIC | Age: 56
End: 2021-07-06
Payer: COMMERCIAL

## 2021-07-06 DIAGNOSIS — R00.2 PALPITATIONS: Primary | ICD-10-CM

## 2021-07-06 PROCEDURE — 93270 REMOTE 30 DAY ECG REV/REPORT: CPT | Performed by: INTERNAL MEDICINE

## 2021-07-06 PROCEDURE — 93272 ECG/REVIEW INTERPRET ONLY: CPT | Performed by: INTERNAL MEDICINE

## 2021-07-06 NOTE — PROGRESS NOTES
Patient received a 2 week event monitor. Instructions given verbally as well as an instruction sheet. Pt verbalized understanding.     [facility] Event Monitoring

## 2021-07-13 ENCOUNTER — HOSPITAL ENCOUNTER (OUTPATIENT)
Dept: CT IMAGING | Age: 56
Discharge: HOME | End: 2021-07-13
Attending: ORTHOPAEDIC SURGERY
Payer: COMMERCIAL

## 2021-07-13 DIAGNOSIS — M17.12 DEGENERATIVE ARTHRITIS OF LEFT KNEE: ICD-10-CM

## 2021-07-13 PROCEDURE — 73700 CT LOWER EXTREMITY W/O DYE: CPT

## 2021-07-31 NOTE — PROGRESS NOTES
Only one episode of fast heartbeat that was actually not atrial fibrillation but something called supraventricular tachycardia. It only lasted for 20 beats. No need to add an antiarrhythmic at this time. If she wants she can try doubling her Toprol if there are significant palpitations. If episodes are occasional and not bothersome, follow-up in 6 to 12 months. If bothersome episodes despite doubling Toprol-XL, come in sooner to discuss with electrophysiology. If she chooses to go to 50 mg daily, let us know we can call in a prescription.

## 2021-08-02 ENCOUNTER — PATIENT MESSAGE (OUTPATIENT)
Dept: CARDIOLOGY CLINIC | Age: 56
End: 2021-08-02

## 2021-08-02 NOTE — TELEPHONE ENCOUNTER
Verified patient with two identifiers. Pt informed. Pt was started on Toprol 12.5 mg based on holter results. She will start an 81 mg aspirin. Pt verbalized understanding. normal...

## 2021-08-03 ENCOUNTER — HOSPITAL ENCOUNTER (OUTPATIENT)
Dept: PREADMISSION TESTING | Age: 56
Discharge: HOME | End: 2021-08-03
Attending: ORTHOPAEDIC SURGERY
Payer: COMMERCIAL

## 2021-08-03 VITALS
DIASTOLIC BLOOD PRESSURE: 58 MMHG | HEART RATE: 75 BPM | HEIGHT: 66 IN | WEIGHT: 202.82 LBS | OXYGEN SATURATION: 99 % | SYSTOLIC BLOOD PRESSURE: 108 MMHG | BODY MASS INDEX: 32.6 KG/M2 | RESPIRATION RATE: 16 BRPM | TEMPERATURE: 98.1 F

## 2021-08-03 LAB
ABO + RH BLD: NORMAL
ALBUMIN SERPL-MCNC: 3 G/DL (ref 3.5–5)
ALBUMIN/GLOB SERPL: 0.7 {RATIO} (ref 1.1–2.2)
ALP SERPL-CCNC: 81 U/L (ref 45–117)
ALT SERPL-CCNC: 44 U/L (ref 12–78)
ANION GAP SERPL CALC-SCNC: 3 MMOL/L (ref 5–15)
APPEARANCE UR: CLEAR
AST SERPL-CCNC: 23 U/L (ref 15–37)
BACTERIA URNS QL MICRO: NEGATIVE /HPF
BILIRUB SERPL-MCNC: 0.8 MG/DL (ref 0.2–1)
BILIRUB UR QL: NEGATIVE
BLOOD GROUP ANTIBODIES SERPL: NORMAL
BUN SERPL-MCNC: 16 MG/DL (ref 6–20)
BUN/CREAT SERPL: 23 (ref 12–20)
CALCIUM SERPL-MCNC: 8.8 MG/DL (ref 8.5–10.1)
CHLORIDE SERPL-SCNC: 108 MMOL/L (ref 97–108)
CO2 SERPL-SCNC: 30 MMOL/L (ref 21–32)
COLOR UR: ABNORMAL
CREAT SERPL-MCNC: 0.69 MG/DL (ref 0.55–1.02)
EPITH CASTS URNS QL MICRO: ABNORMAL /LPF
ERYTHROCYTE [DISTWIDTH] IN BLOOD BY AUTOMATED COUNT: 14.2 % (ref 11.5–14.5)
EST. AVERAGE GLUCOSE BLD GHB EST-MCNC: 126 MG/DL
GLOBULIN SER CALC-MCNC: 4.1 G/DL (ref 2–4)
GLUCOSE SERPL-MCNC: 93 MG/DL (ref 65–100)
GLUCOSE UR STRIP.AUTO-MCNC: NEGATIVE MG/DL
HBA1C MFR BLD: 6 % (ref 4–5.6)
HCT VFR BLD AUTO: 40.5 % (ref 35–47)
HGB BLD-MCNC: 13.1 G/DL (ref 11.5–16)
HGB UR QL STRIP: NEGATIVE
HYALINE CASTS URNS QL MICRO: ABNORMAL /LPF (ref 0–5)
INR PPP: 1.1 (ref 0.9–1.1)
KETONES UR QL STRIP.AUTO: NEGATIVE MG/DL
LEUKOCYTE ESTERASE UR QL STRIP.AUTO: ABNORMAL
MCH RBC QN AUTO: 31 PG (ref 26–34)
MCHC RBC AUTO-ENTMCNC: 32.3 G/DL (ref 30–36.5)
MCV RBC AUTO: 95.7 FL (ref 80–99)
NITRITE UR QL STRIP.AUTO: NEGATIVE
NRBC # BLD: 0 K/UL (ref 0–0.01)
NRBC BLD-RTO: 0 PER 100 WBC
PH UR STRIP: 5.5 [PH] (ref 5–8)
PLATELET # BLD AUTO: 305 K/UL (ref 150–400)
PMV BLD AUTO: 9.8 FL (ref 8.9–12.9)
POTASSIUM SERPL-SCNC: 4.7 MMOL/L (ref 3.5–5.1)
PROT SERPL-MCNC: 7.1 G/DL (ref 6.4–8.2)
PROT UR STRIP-MCNC: NEGATIVE MG/DL
PROTHROMBIN TIME: 11 SEC (ref 9–11.1)
RBC # BLD AUTO: 4.23 M/UL (ref 3.8–5.2)
RBC #/AREA URNS HPF: ABNORMAL /HPF (ref 0–5)
SODIUM SERPL-SCNC: 141 MMOL/L (ref 136–145)
SP GR UR REFRACTOMETRY: 1.02 (ref 1–1.03)
SPECIMEN EXP DATE BLD: NORMAL
UA: UC IF INDICATED,UAUC: ABNORMAL
UROBILINOGEN UR QL STRIP.AUTO: 0.2 EU/DL (ref 0.2–1)
WBC # BLD AUTO: 4.9 K/UL (ref 3.6–11)
WBC URNS QL MICRO: ABNORMAL /HPF (ref 0–4)

## 2021-08-03 PROCEDURE — 86901 BLOOD TYPING SEROLOGIC RH(D): CPT

## 2021-08-03 PROCEDURE — 81001 URINALYSIS AUTO W/SCOPE: CPT

## 2021-08-03 PROCEDURE — 85027 COMPLETE CBC AUTOMATED: CPT

## 2021-08-03 PROCEDURE — 80053 COMPREHEN METABOLIC PANEL: CPT

## 2021-08-03 PROCEDURE — 36415 COLL VENOUS BLD VENIPUNCTURE: CPT

## 2021-08-03 PROCEDURE — 83036 HEMOGLOBIN GLYCOSYLATED A1C: CPT

## 2021-08-03 PROCEDURE — 85610 PROTHROMBIN TIME: CPT

## 2021-08-03 RX ORDER — ACETAMINOPHEN 500 MG
1000 TABLET ORAL ONCE
Status: CANCELLED | OUTPATIENT
Start: 2021-08-11 | End: 2021-08-11

## 2021-08-03 RX ORDER — CELECOXIB 200 MG/1
400 CAPSULE ORAL ONCE
Status: CANCELLED | OUTPATIENT
Start: 2021-08-11 | End: 2021-08-11

## 2021-08-03 RX ORDER — IBUPROFEN 800 MG/1
800 TABLET ORAL
COMMUNITY
End: 2021-08-12

## 2021-08-03 RX ORDER — PREGABALIN 150 MG/1
150 CAPSULE ORAL ONCE
Status: CANCELLED | OUTPATIENT
Start: 2021-08-11 | End: 2021-08-11

## 2021-08-03 RX ORDER — SODIUM CHLORIDE, SODIUM LACTATE, POTASSIUM CHLORIDE, CALCIUM CHLORIDE 600; 310; 30; 20 MG/100ML; MG/100ML; MG/100ML; MG/100ML
25 INJECTION, SOLUTION INTRAVENOUS CONTINUOUS
Status: CANCELLED | OUTPATIENT
Start: 2021-08-11

## 2021-08-03 RX ORDER — FINASTERIDE 5 MG/1
2.5 TABLET, FILM COATED ORAL AS NEEDED
COMMUNITY
End: 2021-08-03

## 2021-08-03 NOTE — PERIOP NOTES
Called to Dr. Alexander Liner office, sp/w Henrik Serve who states patient should hold Enbrel and Methotrexate x1 week prior to surgery and resume x2 weeks after surgery or when surgeon deems wound healing good and able to start back on medications.

## 2021-08-03 NOTE — TELEPHONE ENCOUNTER
----- Message from Oleksandr López NP sent at 8/3/2021  8:31 AM EDT -----  Pls call pt --- if echo ok, shud be fine with surgery as no recurr af per monitor. Echo scheduled for tomorrow.

## 2021-08-03 NOTE — PERIOP NOTES
[facility]  Joint/Spine Preoperative Instructions    Surgery Date 8/11/2021          Time of [address]  Contact # [phone]    1. On the day of your surgery, please report to the Surgical Services Registration Desk and sign in at your designated time. The Surgery Center is located to the right of the Emergency Room. 2. You must have someone with you to drive you home. You should not drive a car for 24 hours following surgery. Please make arrangements for a friend or family member to stay with you for the first 24 hours after your surgery. 3. No food after midnight 8/10/2021. Medications morning of surgery should be taken with a sip of water. Please follow pre-surgery drink instructions that were given at your Pre Admission Testing appointment. 4. We recommend you do not drink any alcoholic beverages for 24 hours before and after your surgery. 5. Contact your surgeons office for instructions on the following medications:   non-steroidal anti-inflammatory drugs (i.e. Advil, Aleve),   vitamins, and supplements. (Some surgeons will want you to stop these medications prior to surgery and others may allow you to take them)  **If you are currently taking Plavix, Coumadin, Aspirin and/or other blood-thinning agents, contact your surgeon for instructions. ** Your surgeon will partner with the physician prescribing these medications to determine if it is safe to stop or if you need to continue taking. Please do not stop taking these medications without instructions from your surgeon    6. Wear comfortable clothes. Wear glasses instead of contacts. Do not bring any money or jewelry. Please bring picture ID, insurance card, and any prearranged co-payment or hospital payment. Do not wear make-up, particularly mascara the morning of your surgery. Do not wear nail polish, particularly if you are having foot /hand surgery.   Wear your hair loose or down, no ponytails, buns, rylee pins or clips. All body piercings must be removed. Please shower with antibacterial soap for three consecutive days before and on the morning of surgery, but do not apply any lotions, powders or deodorants after the shower on the day of surgery. Please use a fresh towels after each shower. Please sleep in clean clothes and change bed linens the night before surgery. Please do not shave for 48 hours prior to surgery. Shaving of the face is acceptable. 7. You should understand that if you do not follow these instructions your surgery may be cancelled. If your physical condition changes (I.e. fever, cold or flu) please contact your surgeon as soon as possible. 8. It is important that you be on time. If a situation occurs where you may be late, please call [phone] (OR Holding Area). 9. If you have any questions and or problems, please call [phone] (Pre-admission Testing). 10. Your surgery time may be subject to change. You will receive a phone call the evening prior if your time changes. 11.  If having outpatient surgery, you must have someone to drive you here, stay with you during the duration of your stay, and to drive you home at time of discharge. 12. The following link is for the educational video for patients and/or families. http://[facility].org/. com/locations/sjalyjtsx-ayjplls-fbjzidc/[email or web address]/educational-materials    Special Instructions: Follow your doctors instructions when to hold Methotrexate or Enbrel. TAKE ALL MEDICATIONS THE DAY OF SURGERY EXCEPT: none      I understand a pre-operative phone call will be made to verify my surgery time. In the event that I am not available, I give permission for a message to be left on my answering service and/or with another person?   yes         ___________________        __________   8/3/2021 @ 0840    (Signature of Patient)             (Witness)                (Date and Time)

## 2021-08-03 NOTE — PERIOP NOTES
Hibiclens/Chlorhexidine    Preventing Infections Before and After  Your Surgery    IMPORTANT INSTRUCTIONS    Please read and follow these instructions carefully. If you are unable to comply with the below instructions your procedure will be cancelled. Every Night for Three (3) nights before your surgery:  1. Shower with an antibacterial soap, such as Dial, or the soap provided at your preassessment appointment. A shower is better than a bath for cleaning your skin. 2. If needed, ask someone to help you reach all areas of your body. Dont forget to clean your belly button with every shower. The night before your surgery: If you lose your Hibiclens/chlorhexidine please contact surgery center or you can purchase it at a local pharmacy  1. On the night before your surgery, shower with an antibacterial soap, such as Dial, or the soap provided at your preassessment appointment. 2. With one packet of Hibiclens/Chlorhexidine in hand, turn water off.  3. Apply Hibiclens antiseptic skin cleanser with a clean, freshly washed washcloth. ? Gently apply to your body from chin to toes (except the genital area) and especially the area(s) where your incision(s) will be. ? Leave Hibiclens/Chlorhexidine on your skin for at least 20 seconds. CAUTION: If needed, Hibiclens/chlorhexidine may be used to clean the folds of skin of the legs (such as in the area of the groin) and on your buttocks and hips. However, do not use Hibiclens/Chlorhexidine above the neck or in the genital area (your bottom) or put inside any area of your body. 4. Turn the water back on and rinse. 5. Dry gently with a clean, freshly washed towel. 6. After your shower, do not use any powder, deodorant, perfumes or lotion. 7. Use clean, freshly washed towels and washcloths every time you shower. 8. Wear clean, freshly washed pajamas to bed the night before surgery. 9. Sleep on clean, freshly washed sheets.   10. Do not allow pets to sleep in your bed with you. The Morning of your surgery:  1. Shower again thoroughly with an antibacterial soap, such as Dial or the soap provided at your preassessment appointment. If needed, ask someone for help to reach all areas of your body. Dont forget to clean your belly button! Rinse. 2. Dry gently with a clean, freshly washed towel. 3. After your shower, do not use any powder, deodorant, perfumes or lotion prior to surgery. 4. Put on clean, freshly washed clothing. Tips to help prevent infections after your surgery:  1. Protect your surgical wound from germs:  ? Hand washing is the most important thing you and your caregivers can do to prevent infections. ? Keep your bandage clean and dry! ? Do not touch your surgical wound. 2. Use clean, freshly washed towels and washcloths every time you shower; do not share bath linens with others. 3. Until your surgical wound is healed, wear clothing and sleep on bed linens each day that are clean and freshly washed. 4. Do not allow pets to sleep in your bed with you or touch your surgical wound. 5. Do not smoke  smoking delays wound healing. This may be a good time to stop smoking. 6. If you have diabetes, it is important for you to manage your blood sugar levels properly before your surgery as well as after your surgery. Poorly managed blood sugar levels slow down wound healing and prevent you from healing completely. If you lose your Hibiclens/chlorhexidine, please call the [facility], or it is available for purchase at your pharmacy.                ___________________      ___________________      8/3/2021 @ 0840  (Signature of Patient)          (Witness)                   (Date and Time)

## 2021-08-03 NOTE — ADVANCED PRACTICE NURSE
PAT Nurse Practitioner   Pre-Operative Chart Review/Assessment:-ORTHOPEDIC/NEUROSURGICAL SPINE                Patient Name:  [place]                                                         Age:   60 y.o.    :  1965     Today's Date:  2021     Date of PAT:   8/3/21      Date of Surgery:    21     Procedure(s):  Left  Total Knee Arthroplasty     Surgeon:   Neeraj Rod     Medical Clearance:  Marian Morales NP                   PLAN:      1)  Cardiac Clearance:  Dr. Bernadette Cox       2)  Diabetic Treatment Consult:  Not indicated-A1C 6.0      3)  Sleep Apnea evaluation:   Not indicated-AUNDREA 1      4) Treatment for MRSA/Staph Aureus:  Negative      5) Additional Concerns:  PAF/PSVT, RA, PONV                Vital Signs:         Vitals:    21 0819 21 0852   BP: (!) 97/54 (!) 108/58   Pulse: 75    Resp: 16    Temp: 98.1 °F (36.7 °C)    SpO2: 99%    Weight: 92 kg (202 lb 13.2 oz)    Height: 5' 6\" (1.676 m)             ____________________________________________  PAST MEDICAL HISTORY  Past Medical History:   Diagnosis Date    Arrhythmia     svt, afib    Cancer ([facility] [address]     basal skin cancer, face    GERD (gastroesophageal reflux disease)     Hypotension     Nausea & vomiting     ostearthritis     RHA (rheumatoid arthritis) ([facility] [address]       ____________________________________________  PAST SURGICAL HISTORY  Past Surgical History:   Procedure Laterality Date    HX APPENDECTOMY      HX CHOLECYSTECTOMY      1 year ago    HX HYSTERECTOMY      HX ORTHOPAEDIC      bi-lateral pins to knees    HX TONSILLECTOMY      HX WISDOM TEETH EXTRACTION      [place] TOTAL KNEE ARTHROPLASTY  2011    Right      ____________________________________________  HOME MEDICATIONS    Current Outpatient Medications   Medication Sig    ibuprofen (MOTRIN) 800 mg tablet Take 800 mg by mouth every eight (8) hours as needed for Pain.  folic acid (FOLVITE) 1 mg tablet Take 1 mg by mouth daily.     methotrexate (RHEUMATREX) 2.5 mg tablet Take 2.5 mg by mouth every Wednesday. Wednesdays    predniSONE (DELTASONE) 5 mg tablet Take 10 mg by mouth as needed. Pain left knee    metoprolol succinate (TOPROL-XL) 25 mg XL tablet Take 1 Tablet by mouth daily.  melatonin 5 mg cap capsule Take 5 mg by mouth nightly as needed (for sleep aid).  etanercept (EnbreL SureClick) 50 mg/mL (1 mL) injection 50 mg every Sunday. Sundays    cyclobenzaprine (FLEXERIL) 10 mg tablet Take 10 mg by mouth nightly as needed. No current facility-administered medications for this encounter.      ____________________________________________  ALLERGIES  Allergies   Allergen Reactions    Morphine Shortness of Breath      ____________________________________________  SOCIAL HISTORY  Social History     Tobacco Use    Smoking status: Never Smoker    Smokeless tobacco: Never Used   Substance Use Topics    Alcohol use:  Yes     Alcohol/week: 1.0 standard drinks     Types: 1 Glasses of wine per week      ____________________________________________        Labs:     Hospital Outpatient Visit on 08/03/2021   Component Date Value Ref Range Status    WBC 08/03/2021 4.9  3.6 - 11.0 K/uL Final    RBC 08/03/2021 4.23  3.80 - 5.20 M/uL Final    HGB 08/03/2021 13.1  11.5 - 16.0 g/dL Final    HCT 08/03/2021 40.5  35.0 - 47.0 % Final    MCV 08/03/2021 95.7  80.0 - 99.0 FL Final    MCH 08/03/2021 31.0  26.0 - 34.0 PG Final    MCHC 08/03/2021 32.3  30.0 - 36.5 g/dL Final    RDW 08/03/2021 14.2  11.5 - 14.5 % Final    PLATELET 14/84/4653 841  150 - 400 K/uL Final    MPV 08/03/2021 9.8  8.9 - 12.9 FL Final    NRBC 08/03/2021 0.0  0  WBC Final    ABSOLUTE NRBC 08/03/2021 0.00  0.00 - 0.01 K/uL Final    Hemoglobin A1c 08/03/2021 6.0* 4.0 - 5.6 % Final    Comment: NEW METHOD  PLEASE NOTE NEW REFERENCE RANGE  (NOTE)  HbA1C Interpretive Ranges  <5.7              Normal  5.7 - 6.4         Consider Prediabetes  >6.5              Consider Diabetes      Est. average glucose 08/03/2021 126  mg/dL Final    INR 08/03/2021 1.1  0.9 - 1.1   Final    A single therapeutic range for Vit K antagonists may not be optimal for all indications - see June, 2008 issue of Chest, American College of Chest Physicians Evidence-Based Clinical Practice Guidelines, 8th Edition.  Prothrombin time 08/03/2021 11.0  9.0 - 11.1 sec Final    Color 08/03/2021 YELLOW/STRAW    Final    Color Reference Range: Straw, Yellow or Dark Yellow    Appearance 08/03/2021 CLEAR  CLEAR   Final    Specific gravity 08/03/2021 1.016  1.003 - 1.030   Final    pH (UA) 08/03/2021 5.5  5.0 - 8.0   Final    Protein 08/03/2021 Negative  NEG mg/dL Final    Glucose 08/03/2021 Negative  NEG mg/dL Final    Ketone 08/03/2021 Negative  NEG mg/dL Final    Bilirubin 08/03/2021 Negative  NEG   Final    Blood 08/03/2021 Negative  NEG   Final    Urobilinogen 08/03/2021 0.2  0.2 - 1.0 EU/dL Final    Nitrites 08/03/2021 Negative  NEG   Final    Leukocyte Esterase 08/03/2021 TRACE* NEG   Final    WBC 08/03/2021 0-4  0 - 4 /hpf Final    RBC 08/03/2021 0-5  0 - 5 /hpf Final    Epithelial cells 08/03/2021 FEW  FEW /lpf Final    Epithelial cell category consists of squamous cells and /or transitional urothelial cells. Renal tubular cells, if present, are separately identified as such.     Bacteria 08/03/2021 Negative  NEG /hpf Final    UA:UC IF INDICATED 08/03/2021 CULTURE NOT INDICATED BY UA RESULT  CNI   Final    Hyaline cast 08/03/2021 0-2  0 - 5 /lpf Final    Sodium 08/03/2021 141  136 - 145 mmol/L Final    Potassium 08/03/2021 4.7  3.5 - 5.1 mmol/L Final    Chloride 08/03/2021 108  97 - 108 mmol/L Final    CO2 08/03/2021 30  21 - 32 mmol/L Final    Anion gap 08/03/2021 3* 5 - 15 mmol/L Final    Glucose 08/03/2021 93  65 - 100 mg/dL Final    BUN 08/03/2021 16  6 - 20 MG/DL Final    Creatinine 08/03/2021 0.69  0.55 - 1.02 MG/DL Final    BUN/Creatinine ratio 08/03/2021 23* 12 - 20   Final    GFR est AA 08/03/2021 >60  >60 ml/min/1.73m2 Final    GFR est non-AA 08/03/2021 >60  >60 ml/min/1.73m2 Final    Estimated GFR is calculated using the IDMS-traceable Modification of Diet in Renal Disease (MDRD) Study equation, reported for both  Americans (GFRAA) and non- Americans (GFRNA), and normalized to 1.73m2 body surface area. The physician must decide which value applies to the patient.  Calcium 08/03/2021 8.8  8.5 - 10.1 MG/DL Final    Bilirubin, total 08/03/2021 0.8  0.2 - 1.0 MG/DL Final    ALT (SGPT) 08/03/2021 44  12 - 78 U/L Final    AST (SGOT) 08/03/2021 23  15 - 37 U/L Final    Alk. phosphatase 08/03/2021 81  45 - 117 U/L Final    Protein, total 08/03/2021 7.1  6.4 - 8.2 g/dL Final    Albumin 08/03/2021 3.0* 3.5 - 5.0 g/dL Final    Globulin 08/03/2021 4.1* 2.0 - 4.0 g/dL Final    A-G Ratio 08/03/2021 0.7* 1.1 - 2.2   Final    Special Requests: 08/03/2021 NO SPECIAL REQUESTS    Final    Culture result: 08/03/2021 MRSA NOT PRESENT    Final    Culture result: 08/03/2021 Screening of patient nares for MRSA is for surveillance purposes and, if positive, to facilitate isolation considerations in high risk settings. It is not intended for automatic decolonization interventions per se as regimens are not sufficiently effective to warrant routine use. Final    Crossmatch Expiration 08/03/2021 08/14/2021,2359   Final    ABO/Rh(D) 08/03/2021 B NEGATIVE   Final    Antibody screen 08/03/2021 NEG   Final          Skin:   Denies open wounds, cuts, sores, rashes or other areas of concern in PAT assessment.         Cammie Boles NP

## 2021-08-03 NOTE — PERIOP NOTES
Orthopedic and Spine Patients: Instructions on When You Can   Eat or Drink Before Surgery      You have been provided 2 pre-surgery drinks received at your pre-admission testing appointment.  Night before surgery:  o You should drink one bottle of the  pre-surgery drink at bedtime. No food after midnight!  Day of Surgery:  o Complete 2nd bottle of the pre-surgery drink 1 hour prior to arrival at hospital.  For questions call Pre-Admission Testing at [phone]. They are available from 8:00am-5:00pm, Monday through Friday.

## 2021-08-03 NOTE — PERIOP NOTES
Incentive Spirometer        Using the incentive spirometer helps expand the small air sacs of your lungs, helps you breathe deeply, and helps improve your lung function. Use your incentive spirometer twice a day (10 breaths each time) prior to surgery. How to Use Your Incentive Spirometer:  1. Hold the incentive spirometer in an upright position. 2. Breathe out as usual.   3. Place the mouthpiece in your mouth and seal your lips tightly around it. 4. Take a deep breath. Breathe in slowly and as deeply as possible. Keep the blue flow rate guide between the arrows. 5. Hold your breath as long as possible. Then exhale slowly and allow the piston to fall to the bottom of the column. 6. Rest for a few seconds and repeat steps one through five at least 10 times. PAT Tidal Volume_____2250, 2000______  x_____2_____  Date___8/3/2021____    Gunnar Ross THE INCENTIVE SPIROMETER WITH YOU TO THE HOSPITAL ON THE DAY OF YOUR SURGERY. Opportunity given to ask and answer questions as well as to observe return demonstration.     Patient signature_____________________________          Witness____________________________

## 2021-08-04 ENCOUNTER — ANCILLARY PROCEDURE (OUTPATIENT)
Dept: CARDIOLOGY CLINIC | Age: 56
End: 2021-08-04
Payer: COMMERCIAL

## 2021-08-04 VITALS
HEIGHT: 66 IN | SYSTOLIC BLOOD PRESSURE: 108 MMHG | WEIGHT: 202 LBS | DIASTOLIC BLOOD PRESSURE: 58 MMHG | BODY MASS INDEX: 32.47 KG/M2

## 2021-08-04 DIAGNOSIS — I48.0 PAF (PAROXYSMAL ATRIAL FIBRILLATION) (HCC): ICD-10-CM

## 2021-08-04 DIAGNOSIS — M06.9 RHEUMATOID ARTHRITIS, INVOLVING UNSPECIFIED SITE, UNSPECIFIED WHETHER RHEUMATOID FACTOR PRESENT (HCC): ICD-10-CM

## 2021-08-04 DIAGNOSIS — R06.02 SOB (SHORTNESS OF BREATH): ICD-10-CM

## 2021-08-04 DIAGNOSIS — R42 LIGHTHEADEDNESS: ICD-10-CM

## 2021-08-04 LAB
BACTERIA SPEC CULT: NORMAL
BACTERIA SPEC CULT: NORMAL
ECHO AO ASC DIAM: 3.45 CM
ECHO AO ROOT DIAM: 3.18 CM
ECHO AV PEAK GRADIENT: 6.09 MMHG
ECHO AV PEAK VELOCITY: 123.35 CM/S
ECHO EST RA PRESSURE: 3 MMHG
ECHO LA AREA 4C: 17.41 CM2
ECHO LA MAJOR AXIS: 3.91 CM
ECHO LA MINOR AXIS: 1.95 CM
ECHO LA VOL 2C: 57.08 ML (ref 22–52)
ECHO LA VOL 4C: 38.59 ML (ref 22–52)
ECHO LA VOL BP: 52.52 ML (ref 22–52)
ECHO LA VOL/BSA BIPLANE: 26.13 ML/M2 (ref 16–28)
ECHO LA VOLUME INDEX A2C: 28.4 ML/M2 (ref 16–28)
ECHO LA VOLUME INDEX A4C: 19.2 ML/M2 (ref 16–28)
ECHO LV E' LATERAL VELOCITY: 14.91 CM/S
ECHO LV GLOBAL LONGITUDINAL STRAIN (GLS): -16.2 PERCENT
ECHO LV INTERNAL DIMENSION DIASTOLIC: 4.94 CM (ref 3.9–5.3)
ECHO LV INTERNAL DIMENSION SYSTOLIC: 3.65 CM
ECHO LV ISOVOLUMETRIC RELAXATION TIME (IVRT): 79.92 MS
ECHO LV IVSD: 0.87 CM (ref 0.6–0.9)
ECHO LV MASS 2D: 140.6 G (ref 67–162)
ECHO LV MASS INDEX 2D: 69.9 G/M2 (ref 43–95)
ECHO LV POSTERIOR WALL DIASTOLIC: 0.8 CM (ref 0.6–0.9)
ECHO LVOT PEAK GRADIENT: 3.78 MMHG
ECHO LVOT PEAK VELOCITY: 97.17 CM/S
ECHO MV "A" WAVE DURATION: 133.21 MS
ECHO MV A VELOCITY: 58.43 CM/S
ECHO MV E DECELERATION TIME (DT): 171.27 MS
ECHO MV E VELOCITY: 77.91 CM/S
ECHO MV E/A RATIO: 1.33
ECHO MV E/E' LATERAL: 5.23
ECHO RIGHT VENTRICULAR SYSTOLIC PRESSURE (RVSP): 20.73 MMHG
ECHO RV TAPSE: 2.57 CM (ref 1.5–2)
ECHO TV REGURGITANT MAX VELOCITY: 210.52 CM/S
ECHO TV REGURGITANT PEAK GRADIENT: 17.73 MMHG
GLOBAL LONGITUDINAL STRAIN 2 CHAMBER: -19.7 PERCENT
GLOBAL LONGITUDINAL STRAIN 4 CHAMBER: -18.2 PERCENT
GLOBAL LONGITUDINAL STRAIN LONG AXIS: -10.5 PERCENT
LA VOL DISK BP: 50.4 ML (ref 22–52)
SERVICE CMNT-IMP: NORMAL

## 2021-08-04 PROCEDURE — 93306 TTE W/DOPPLER COMPLETE: CPT | Performed by: INTERNAL MEDICINE

## 2021-08-05 ENCOUNTER — TELEPHONE (OUTPATIENT)
Dept: CARDIOLOGY CLINIC | Age: 56
End: 2021-08-05

## 2021-08-05 NOTE — PROGRESS NOTES
Echo shows normal heart pumping strength and thickness. Mildly leaky mitral valve which is something to just monitor over time. Make sure BP is well controlled.

## 2021-08-05 NOTE — TELEPHONE ENCOUNTER
Verified patient with two identifiers. Pt informed of monitor results in detail. She states the palpitations are far and few between. She would like to stay on the current dose of Toprol. If palpitations become more bothersome she will let us know. Pt verbalized understanding.

## 2021-08-05 NOTE — TELEPHONE ENCOUNTER
----- Message from Tiara Padilla MD sent at 7/31/2021  3:24 PM EDT -----  Only one episode of fast heartbeat that was actually not atrial fibrillation but something called supraventricular tachycardia. It only lasted for 20 beats. No need to add an antiarrhythmic at this time. If she wants she can try doubling her Toprol if there are significant palpitations. If episodes are occasional and not bothersome, follow-up in 6 to 12 months. If bothersome episodes despite doubling Toprol-XL, come in sooner to discuss with electrophysiology. If she chooses to go to 50 mg daily, let us know we can call in a prescription.

## 2021-08-06 ENCOUNTER — TELEPHONE (OUTPATIENT)
Dept: CARDIOLOGY CLINIC | Age: 56
End: 2021-08-06

## 2021-08-06 NOTE — TELEPHONE ENCOUNTER
----- Message from Ravinder Magana NP sent at 8/6/2021  8:29 AM EDT -----  She can be cleared for her surgery from a cardiology perspective.

## 2021-08-10 ENCOUNTER — ANESTHESIA EVENT (OUTPATIENT)
Dept: SURGERY | Age: 56
End: 2021-08-10
Payer: COMMERCIAL

## 2021-08-10 NOTE — ANESTHESIA PREPROCEDURE EVALUATION
Relevant Problems   No relevant active problems       Anesthetic History     PONV          Review of Systems / Medical History  Patient summary reviewed and pertinent labs reviewed    Pulmonary  Within defined limits                 Neuro/Psych   Within defined limits           Cardiovascular            Dysrhythmias       Exercise tolerance: >4 METS  Comments: TTE 2021  · LV: Estimated LVEF is 55 - 60%. Normal cavity size, wall thickness, systolic function (ejection fraction normal) and diastolic function. Wall motion: normal.  · MV: Mild mitral valve regurgitation is present. Exercise stress test  Baseline ECG: Normal EKG. There was no ST segment deviation noted during stress. Arrhythmias during recovery: rare PACs. GI/Hepatic/Renal     GERD: well controlled           Endo/Other        Obesity and arthritis     Other Findings   Comments: Rheumatoid arthritis  DJD           Physical Exam    Airway  Mallampati: II  TM Distance: > 6 cm  Neck ROM: normal range of motion   Mouth opening: Normal     Cardiovascular  Regular rate and rhythm,  S1 and S2 normal,  no murmur, click, rub, or gallop  Rhythm: regular  Rate: normal         Dental  No notable dental hx       Pulmonary  Breath sounds clear to auscultation               Abdominal  GI exam deferred       Other Findings            Anesthetic Plan    ASA: 2  Anesthesia type: general and total IV anesthesia - saphenous block    Monitoring Plan: BIS  Post-op pain plan if not by surgeon: peripheral nerve block single    Induction: Intravenous  Anesthetic plan and risks discussed with: Patient      Hx of PONV.  Scop patch, decadron, benadryl, zofran + TIVA

## 2021-08-11 ENCOUNTER — ANESTHESIA (OUTPATIENT)
Dept: SURGERY | Age: 56
End: 2021-08-11
Payer: COMMERCIAL

## 2021-08-11 ENCOUNTER — HOSPITAL ENCOUNTER (OUTPATIENT)
Age: 56
Setting detail: OBSERVATION
Discharge: HOME HEALTH | End: 2021-08-12
Attending: ORTHOPAEDIC SURGERY | Admitting: ORTHOPAEDIC SURGERY
Payer: COMMERCIAL

## 2021-08-11 DIAGNOSIS — Z96.652 STATUS POST TOTAL LEFT KNEE REPLACEMENT: Primary | ICD-10-CM

## 2021-08-11 PROBLEM — M17.12 OSTEOARTHRITIS OF LEFT KNEE: Status: ACTIVE | Noted: 2021-08-11

## 2021-08-11 PROCEDURE — 74011250637 HC RX REV CODE- 250/637: Performed by: STUDENT IN AN ORGANIZED HEALTH CARE EDUCATION/TRAINING PROGRAM

## 2021-08-11 PROCEDURE — 77030029820: Performed by: ORTHOPAEDIC SURGERY

## 2021-08-11 PROCEDURE — 2709999900 HC NON-CHARGEABLE SUPPLY: Performed by: ORTHOPAEDIC SURGERY

## 2021-08-11 PROCEDURE — 77030019908 HC STETH ESOPH SIMS -A: Performed by: NURSE ANESTHETIST, CERTIFIED REGISTERED

## 2021-08-11 PROCEDURE — 74011000250 HC RX REV CODE- 250: Performed by: ORTHOPAEDIC SURGERY

## 2021-08-11 PROCEDURE — 76060000037 HC ANESTHESIA 3 TO 3.5 HR: Performed by: ORTHOPAEDIC SURGERY

## 2021-08-11 PROCEDURE — C1776 JOINT DEVICE (IMPLANTABLE): HCPCS | Performed by: ORTHOPAEDIC SURGERY

## 2021-08-11 PROCEDURE — 77030003028 HC SUT VCRL J&J -A: Performed by: ORTHOPAEDIC SURGERY

## 2021-08-11 PROCEDURE — 74011250636 HC RX REV CODE- 250/636: Performed by: NURSE ANESTHETIST, CERTIFIED REGISTERED

## 2021-08-11 PROCEDURE — 77030008684 HC TU ET CUF COVD -B: Performed by: NURSE ANESTHETIST, CERTIFIED REGISTERED

## 2021-08-11 PROCEDURE — 77030041279 HC DRSG PRMSL AG MDII -B: Performed by: ORTHOPAEDIC SURGERY

## 2021-08-11 PROCEDURE — 77030029830 HC FEM INST CKPNT DISP STRY -B: Performed by: ORTHOPAEDIC SURGERY

## 2021-08-11 PROCEDURE — 74011250636 HC RX REV CODE- 250/636: Performed by: STUDENT IN AN ORGANIZED HEALTH CARE EDUCATION/TRAINING PROGRAM

## 2021-08-11 PROCEDURE — 77030038149 HC BLD SAW SAG STRY -D: Performed by: ORTHOPAEDIC SURGERY

## 2021-08-11 PROCEDURE — 76010000173 HC OR TIME 3 TO 3.5 HR INTENSV-TIER 1: Performed by: ORTHOPAEDIC SURGERY

## 2021-08-11 PROCEDURE — 77030038021 HC TBNG IRR EMAX2 STRY -C: Performed by: ORTHOPAEDIC SURGERY

## 2021-08-11 PROCEDURE — 77030038692 HC WND DEB SYS IRMX -B: Performed by: ORTHOPAEDIC SURGERY

## 2021-08-11 PROCEDURE — 94760 N-INVAS EAR/PLS OXIMETRY 1: CPT

## 2021-08-11 PROCEDURE — 77030029828 HC FEM TIB CKPNT KT DISP STRY -B: Performed by: ORTHOPAEDIC SURGERY

## 2021-08-11 PROCEDURE — 77030038023 HC PIN FIX MAKO STRY -B: Performed by: ORTHOPAEDIC SURGERY

## 2021-08-11 PROCEDURE — C1713 ANCHOR/SCREW BN/BN,TIS/BN: HCPCS | Performed by: ORTHOPAEDIC SURGERY

## 2021-08-11 PROCEDURE — 76210000000 HC OR PH I REC 2 TO 2.5 HR: Performed by: ORTHOPAEDIC SURGERY

## 2021-08-11 PROCEDURE — 74011250637 HC RX REV CODE- 250/637: Performed by: ORTHOPAEDIC SURGERY

## 2021-08-11 PROCEDURE — 77030006835 HC BLD SAW SAG STRY -B: Performed by: ORTHOPAEDIC SURGERY

## 2021-08-11 PROCEDURE — 74011000250 HC RX REV CODE- 250: Performed by: NURSE ANESTHETIST, CERTIFIED REGISTERED

## 2021-08-11 PROCEDURE — 99218 HC RM OBSERVATION: CPT

## 2021-08-11 PROCEDURE — 74011250636 HC RX REV CODE- 250/636: Performed by: ANESTHESIOLOGY

## 2021-08-11 PROCEDURE — 74011250636 HC RX REV CODE- 250/636: Performed by: ORTHOPAEDIC SURGERY

## 2021-08-11 PROCEDURE — 77030003029 HC SUT VCRL J&J -B: Performed by: ORTHOPAEDIC SURGERY

## 2021-08-11 PROCEDURE — 77030026438 HC STYL ET INTUB CARD -A: Performed by: NURSE ANESTHETIST, CERTIFIED REGISTERED

## 2021-08-11 PROCEDURE — 77030000032 HC CUF TRNQT ZIMM -B: Performed by: ORTHOPAEDIC SURGERY

## 2021-08-11 PROCEDURE — 77030013079 HC BLNKT BAIR HGGR 3M -A: Performed by: NURSE ANESTHETIST, CERTIFIED REGISTERED

## 2021-08-11 PROCEDURE — 77010033678 HC OXYGEN DAILY

## 2021-08-11 PROCEDURE — 77030014650 HC SEAL MTRX FLOSEL BAXT -C: Performed by: ORTHOPAEDIC SURGERY

## 2021-08-11 PROCEDURE — 77030010507 HC ADH SKN DERMBND J&J -B: Performed by: ORTHOPAEDIC SURGERY

## 2021-08-11 PROCEDURE — 77030018673: Performed by: ORTHOPAEDIC SURGERY

## 2021-08-11 PROCEDURE — 77030010785: Performed by: ORTHOPAEDIC SURGERY

## 2021-08-11 DEVICE — CEMENT BNE 20ML 41GM FULL DOSE PMMA W/ TOBRA M VISC RADPQ: Type: IMPLANTABLE DEVICE | Site: KNEE | Status: FUNCTIONAL

## 2021-08-11 DEVICE — ASYMMETRIC PATELLA
Type: IMPLANTABLE DEVICE | Site: KNEE | Status: FUNCTIONAL
Brand: TRIATHLON

## 2021-08-11 DEVICE — KNEE K1 TOT HEMI STD CEM -- IMPL CAPPED K1: Type: IMPLANTABLE DEVICE | Status: FUNCTIONAL

## 2021-08-11 DEVICE — INSERT TIB CS 3 12 MM ARTC POST KNEE BEAR TECHNOLOGY X3: Type: IMPLANTABLE DEVICE | Site: KNEE | Status: FUNCTIONAL

## 2021-08-11 DEVICE — CRUCIATE RETAINING FEMORAL
Type: IMPLANTABLE DEVICE | Site: KNEE | Status: FUNCTIONAL
Brand: TRIATHLON

## 2021-08-11 DEVICE — UNIVERSAL TIBIAL BASEPLATE
Type: IMPLANTABLE DEVICE | Site: KNEE | Status: FUNCTIONAL
Brand: TRIATHLON

## 2021-08-11 RX ORDER — ROPIVACAINE HYDROCHLORIDE 5 MG/ML
60 INJECTION, SOLUTION EPIDURAL; INFILTRATION; PERINEURAL ONCE
Status: DISCONTINUED | OUTPATIENT
Start: 2021-08-11 | End: 2021-08-11 | Stop reason: HOSPADM

## 2021-08-11 RX ORDER — OXYCODONE HYDROCHLORIDE 5 MG/1
5 TABLET ORAL
Status: DISCONTINUED | OUTPATIENT
Start: 2021-08-11 | End: 2021-08-12 | Stop reason: HOSPADM

## 2021-08-11 RX ORDER — SCOLOPAMINE TRANSDERMAL SYSTEM 1 MG/1
1 PATCH, EXTENDED RELEASE TRANSDERMAL
Status: DISCONTINUED | OUTPATIENT
Start: 2021-08-11 | End: 2021-08-12 | Stop reason: HOSPADM

## 2021-08-11 RX ORDER — FENTANYL CITRATE 50 UG/ML
INJECTION, SOLUTION INTRAMUSCULAR; INTRAVENOUS AS NEEDED
Status: DISCONTINUED | OUTPATIENT
Start: 2021-08-11 | End: 2021-08-11 | Stop reason: HOSPADM

## 2021-08-11 RX ORDER — GLYCOPYRROLATE 0.2 MG/ML
INJECTION INTRAMUSCULAR; INTRAVENOUS AS NEEDED
Status: DISCONTINUED | OUTPATIENT
Start: 2021-08-11 | End: 2021-08-11 | Stop reason: HOSPADM

## 2021-08-11 RX ORDER — PROPOFOL 10 MG/ML
INJECTION, EMULSION INTRAVENOUS AS NEEDED
Status: DISCONTINUED | OUTPATIENT
Start: 2021-08-11 | End: 2021-08-11 | Stop reason: HOSPADM

## 2021-08-11 RX ORDER — CELECOXIB 200 MG/1
400 CAPSULE ORAL ONCE
Status: COMPLETED | OUTPATIENT
Start: 2021-08-11 | End: 2021-08-11

## 2021-08-11 RX ORDER — FAMOTIDINE 20 MG/1
20 TABLET, FILM COATED ORAL 2 TIMES DAILY
Status: DISCONTINUED | OUTPATIENT
Start: 2021-08-11 | End: 2021-08-12 | Stop reason: HOSPADM

## 2021-08-11 RX ORDER — ROPIVACAINE HYDROCHLORIDE 5 MG/ML
INJECTION, SOLUTION EPIDURAL; INFILTRATION; PERINEURAL
Status: COMPLETED | OUTPATIENT
Start: 2021-08-11 | End: 2021-08-11

## 2021-08-11 RX ORDER — SODIUM CHLORIDE 0.9 % (FLUSH) 0.9 %
5-40 SYRINGE (ML) INJECTION AS NEEDED
Status: DISCONTINUED | OUTPATIENT
Start: 2021-08-11 | End: 2021-08-11 | Stop reason: HOSPADM

## 2021-08-11 RX ORDER — ACETAMINOPHEN 500 MG
1000 TABLET ORAL ONCE
Status: COMPLETED | OUTPATIENT
Start: 2021-08-11 | End: 2021-08-11

## 2021-08-11 RX ORDER — SODIUM CHLORIDE 9 MG/ML
125 INJECTION, SOLUTION INTRAVENOUS CONTINUOUS
Status: DISCONTINUED | OUTPATIENT
Start: 2021-08-11 | End: 2021-08-12 | Stop reason: HOSPADM

## 2021-08-11 RX ORDER — ROPIVACAINE HYDROCHLORIDE 5 MG/ML
INJECTION, SOLUTION EPIDURAL; INFILTRATION; PERINEURAL AS NEEDED
Status: DISCONTINUED | OUTPATIENT
Start: 2021-08-11 | End: 2021-08-11 | Stop reason: HOSPADM

## 2021-08-11 RX ORDER — ONDANSETRON 2 MG/ML
4 INJECTION INTRAMUSCULAR; INTRAVENOUS
Status: DISCONTINUED | OUTPATIENT
Start: 2021-08-11 | End: 2021-08-12 | Stop reason: HOSPADM

## 2021-08-11 RX ORDER — OXYCODONE HYDROCHLORIDE 5 MG/1
10 TABLET ORAL
Status: DISCONTINUED | OUTPATIENT
Start: 2021-08-11 | End: 2021-08-12 | Stop reason: HOSPADM

## 2021-08-11 RX ORDER — DIPHENHYDRAMINE HCL 12.5MG/5ML
12.5 LIQUID (ML) ORAL
Status: DISCONTINUED | OUTPATIENT
Start: 2021-08-11 | End: 2021-08-12 | Stop reason: HOSPADM

## 2021-08-11 RX ORDER — PHENYLEPHRINE HCL IN 0.9% NACL 0.4MG/10ML
SYRINGE (ML) INTRAVENOUS AS NEEDED
Status: DISCONTINUED | OUTPATIENT
Start: 2021-08-11 | End: 2021-08-11 | Stop reason: HOSPADM

## 2021-08-11 RX ORDER — FACIAL-BODY WIPES
10 EACH TOPICAL DAILY PRN
Status: DISCONTINUED | OUTPATIENT
Start: 2021-08-13 | End: 2021-08-12 | Stop reason: HOSPADM

## 2021-08-11 RX ORDER — ASPIRIN 81 MG/1
81 TABLET ORAL 2 TIMES DAILY
Status: DISCONTINUED | OUTPATIENT
Start: 2021-08-11 | End: 2021-08-12 | Stop reason: HOSPADM

## 2021-08-11 RX ORDER — MIDAZOLAM HYDROCHLORIDE 1 MG/ML
INJECTION, SOLUTION INTRAMUSCULAR; INTRAVENOUS AS NEEDED
Status: DISCONTINUED | OUTPATIENT
Start: 2021-08-11 | End: 2021-08-11 | Stop reason: HOSPADM

## 2021-08-11 RX ORDER — SUCCINYLCHOLINE CHLORIDE 20 MG/ML
INJECTION INTRAMUSCULAR; INTRAVENOUS AS NEEDED
Status: DISCONTINUED | OUTPATIENT
Start: 2021-08-11 | End: 2021-08-11 | Stop reason: HOSPADM

## 2021-08-11 RX ORDER — DEXAMETHASONE SODIUM PHOSPHATE 4 MG/ML
INJECTION, SOLUTION INTRA-ARTICULAR; INTRALESIONAL; INTRAMUSCULAR; INTRAVENOUS; SOFT TISSUE AS NEEDED
Status: DISCONTINUED | OUTPATIENT
Start: 2021-08-11 | End: 2021-08-11 | Stop reason: HOSPADM

## 2021-08-11 RX ORDER — SODIUM CHLORIDE 0.9 % (FLUSH) 0.9 %
5-40 SYRINGE (ML) INJECTION EVERY 8 HOURS
Status: DISCONTINUED | OUTPATIENT
Start: 2021-08-11 | End: 2021-08-12 | Stop reason: HOSPADM

## 2021-08-11 RX ORDER — SODIUM CHLORIDE 0.9 % (FLUSH) 0.9 %
5-40 SYRINGE (ML) INJECTION AS NEEDED
Status: DISCONTINUED | OUTPATIENT
Start: 2021-08-11 | End: 2021-08-12 | Stop reason: HOSPADM

## 2021-08-11 RX ORDER — METOPROLOL SUCCINATE 25 MG/1
25 TABLET, EXTENDED RELEASE ORAL DAILY
Status: DISCONTINUED | OUTPATIENT
Start: 2021-08-12 | End: 2021-08-12 | Stop reason: HOSPADM

## 2021-08-11 RX ORDER — HYDROMORPHONE HYDROCHLORIDE 1 MG/ML
0.5 INJECTION, SOLUTION INTRAMUSCULAR; INTRAVENOUS; SUBCUTANEOUS
Status: DISCONTINUED | OUTPATIENT
Start: 2021-08-11 | End: 2021-08-12 | Stop reason: HOSPADM

## 2021-08-11 RX ORDER — NALOXONE HYDROCHLORIDE 0.4 MG/ML
0.4 INJECTION, SOLUTION INTRAMUSCULAR; INTRAVENOUS; SUBCUTANEOUS AS NEEDED
Status: DISCONTINUED | OUTPATIENT
Start: 2021-08-11 | End: 2021-08-12 | Stop reason: HOSPADM

## 2021-08-11 RX ORDER — ACETAMINOPHEN 325 MG/1
650 TABLET ORAL EVERY 6 HOURS
Status: DISCONTINUED | OUTPATIENT
Start: 2021-08-12 | End: 2021-08-12 | Stop reason: HOSPADM

## 2021-08-11 RX ORDER — ROCURONIUM BROMIDE 10 MG/ML
INJECTION, SOLUTION INTRAVENOUS AS NEEDED
Status: DISCONTINUED | OUTPATIENT
Start: 2021-08-11 | End: 2021-08-11 | Stop reason: HOSPADM

## 2021-08-11 RX ORDER — SODIUM CHLORIDE 0.9 % (FLUSH) 0.9 %
5-40 SYRINGE (ML) INJECTION EVERY 8 HOURS
Status: DISCONTINUED | OUTPATIENT
Start: 2021-08-11 | End: 2021-08-11 | Stop reason: HOSPADM

## 2021-08-11 RX ORDER — SODIUM CHLORIDE, SODIUM LACTATE, POTASSIUM CHLORIDE, CALCIUM CHLORIDE 600; 310; 30; 20 MG/100ML; MG/100ML; MG/100ML; MG/100ML
25 INJECTION, SOLUTION INTRAVENOUS CONTINUOUS
Status: DISCONTINUED | OUTPATIENT
Start: 2021-08-11 | End: 2021-08-11 | Stop reason: HOSPADM

## 2021-08-11 RX ORDER — POLYETHYLENE GLYCOL 3350 17 G/17G
17 POWDER, FOR SOLUTION ORAL DAILY
Status: DISCONTINUED | OUTPATIENT
Start: 2021-08-12 | End: 2021-08-12 | Stop reason: HOSPADM

## 2021-08-11 RX ORDER — ONDANSETRON 2 MG/ML
INJECTION INTRAMUSCULAR; INTRAVENOUS AS NEEDED
Status: DISCONTINUED | OUTPATIENT
Start: 2021-08-11 | End: 2021-08-11 | Stop reason: HOSPADM

## 2021-08-11 RX ORDER — FENTANYL CITRATE 50 UG/ML
25 INJECTION, SOLUTION INTRAMUSCULAR; INTRAVENOUS
Status: COMPLETED | OUTPATIENT
Start: 2021-08-11 | End: 2021-08-11

## 2021-08-11 RX ORDER — AMOXICILLIN 250 MG
1 CAPSULE ORAL 2 TIMES DAILY
Status: DISCONTINUED | OUTPATIENT
Start: 2021-08-11 | End: 2021-08-12 | Stop reason: HOSPADM

## 2021-08-11 RX ORDER — LIDOCAINE HYDROCHLORIDE 20 MG/ML
INJECTION, SOLUTION EPIDURAL; INFILTRATION; INTRACAUDAL; PERINEURAL AS NEEDED
Status: DISCONTINUED | OUTPATIENT
Start: 2021-08-11 | End: 2021-08-11 | Stop reason: HOSPADM

## 2021-08-11 RX ORDER — PREGABALIN 75 MG/1
150 CAPSULE ORAL ONCE
Status: COMPLETED | OUTPATIENT
Start: 2021-08-11 | End: 2021-08-11

## 2021-08-11 RX ORDER — ONDANSETRON 2 MG/ML
4 INJECTION INTRAMUSCULAR; INTRAVENOUS AS NEEDED
Status: DISCONTINUED | OUTPATIENT
Start: 2021-08-11 | End: 2021-08-11 | Stop reason: HOSPADM

## 2021-08-11 RX ORDER — DEXAMETHASONE SODIUM PHOSPHATE 4 MG/ML
10 INJECTION, SOLUTION INTRA-ARTICULAR; INTRALESIONAL; INTRAMUSCULAR; INTRAVENOUS; SOFT TISSUE ONCE
Status: DISCONTINUED | OUTPATIENT
Start: 2021-08-12 | End: 2021-08-12 | Stop reason: HOSPADM

## 2021-08-11 RX ORDER — KETOROLAC TROMETHAMINE 30 MG/ML
30 INJECTION, SOLUTION INTRAMUSCULAR; INTRAVENOUS EVERY 6 HOURS
Status: DISCONTINUED | OUTPATIENT
Start: 2021-08-12 | End: 2021-08-12 | Stop reason: HOSPADM

## 2021-08-11 RX ORDER — NEOSTIGMINE METHYLSULFATE 1 MG/ML
INJECTION, SOLUTION INTRAVENOUS AS NEEDED
Status: DISCONTINUED | OUTPATIENT
Start: 2021-08-11 | End: 2021-08-11 | Stop reason: HOSPADM

## 2021-08-11 RX ORDER — ONDANSETRON 4 MG/1
4 TABLET, ORALLY DISINTEGRATING ORAL
Status: DISCONTINUED | OUTPATIENT
Start: 2021-08-13 | End: 2021-08-12 | Stop reason: HOSPADM

## 2021-08-11 RX ADMIN — MIDAZOLAM HYDROCHLORIDE 1 MG: 1 INJECTION, SOLUTION INTRAMUSCULAR; INTRAVENOUS at 12:41

## 2021-08-11 RX ADMIN — CELECOXIB 400 MG: 200 CAPSULE ORAL at 12:14

## 2021-08-11 RX ADMIN — CEFAZOLIN SODIUM 2 G: 1 INJECTION, POWDER, FOR SOLUTION INTRAMUSCULAR; INTRAVENOUS at 23:47

## 2021-08-11 RX ADMIN — SODIUM CHLORIDE, POTASSIUM CHLORIDE, SODIUM LACTATE AND CALCIUM CHLORIDE: 600; 310; 30; 20 INJECTION, SOLUTION INTRAVENOUS at 17:18

## 2021-08-11 RX ADMIN — PROPOFOL 200 MG: 10 INJECTION, EMULSION INTRAVENOUS at 14:16

## 2021-08-11 RX ADMIN — FENTANYL CITRATE 25 MCG: 50 INJECTION, SOLUTION INTRAMUSCULAR; INTRAVENOUS at 18:40

## 2021-08-11 RX ADMIN — MIDAZOLAM HYDROCHLORIDE 1 MG: 1 INJECTION, SOLUTION INTRAMUSCULAR; INTRAVENOUS at 14:06

## 2021-08-11 RX ADMIN — FENTANYL CITRATE 25 MCG: 50 INJECTION, SOLUTION INTRAMUSCULAR; INTRAVENOUS at 17:52

## 2021-08-11 RX ADMIN — HYDROMORPHONE HYDROCHLORIDE 0.5 MG: 1 INJECTION, SOLUTION INTRAMUSCULAR; INTRAVENOUS; SUBCUTANEOUS at 20:57

## 2021-08-11 RX ADMIN — FENTANYL CITRATE 25 MCG: 50 INJECTION, SOLUTION INTRAMUSCULAR; INTRAVENOUS at 18:15

## 2021-08-11 RX ADMIN — Medication 80 MCG: at 14:49

## 2021-08-11 RX ADMIN — FENTANYL CITRATE 100 MCG: 50 INJECTION, SOLUTION INTRAMUSCULAR; INTRAVENOUS at 14:13

## 2021-08-11 RX ADMIN — KETOROLAC TROMETHAMINE 30 MG: 30 INJECTION, SOLUTION INTRAMUSCULAR; INTRAVENOUS at 23:48

## 2021-08-11 RX ADMIN — ROCURONIUM BROMIDE 10 MG: 10 INJECTION INTRAVENOUS at 14:16

## 2021-08-11 RX ADMIN — SODIUM CHLORIDE 125 ML/HR: 9 INJECTION, SOLUTION INTRAVENOUS at 17:50

## 2021-08-11 RX ADMIN — ONDANSETRON 4 MG: 2 INJECTION INTRAMUSCULAR; INTRAVENOUS at 20:23

## 2021-08-11 RX ADMIN — WATER 2 G: 1 INJECTION INTRAMUSCULAR; INTRAVENOUS; SUBCUTANEOUS at 14:20

## 2021-08-11 RX ADMIN — DEXAMETHASONE SODIUM PHOSPHATE 8 MG: 4 INJECTION, SOLUTION INTRAMUSCULAR; INTRAVENOUS at 14:22

## 2021-08-11 RX ADMIN — ASPIRIN 81 MG: 81 TABLET, COATED ORAL at 21:00

## 2021-08-11 RX ADMIN — Medication 1000 MG: at 12:14

## 2021-08-11 RX ADMIN — NEOSTIGMINE METHYLSULFATE 3 MG: 1 INJECTION, SOLUTION INTRAVENOUS at 16:34

## 2021-08-11 RX ADMIN — OXYCODONE HYDROCHLORIDE 10 MG: 5 TABLET ORAL at 18:43

## 2021-08-11 RX ADMIN — OXYCODONE HYDROCHLORIDE 10 MG: 5 TABLET ORAL at 23:46

## 2021-08-11 RX ADMIN — ROCURONIUM BROMIDE 20 MG: 10 INJECTION INTRAVENOUS at 14:19

## 2021-08-11 RX ADMIN — SODIUM CHLORIDE, POTASSIUM CHLORIDE, SODIUM LACTATE AND CALCIUM CHLORIDE 25 ML/HR: 600; 310; 30; 20 INJECTION, SOLUTION INTRAVENOUS at 12:39

## 2021-08-11 RX ADMIN — FAMOTIDINE 20 MG: 20 TABLET ORAL at 21:01

## 2021-08-11 RX ADMIN — GLYCOPYRROLATE 0.4 MG: 0.2 INJECTION, SOLUTION INTRAMUSCULAR; INTRAVENOUS at 16:34

## 2021-08-11 RX ADMIN — PREGABALIN 150 MG: 75 CAPSULE ORAL at 12:15

## 2021-08-11 RX ADMIN — Medication 3 AMPULE: at 12:15

## 2021-08-11 RX ADMIN — SODIUM CHLORIDE 40 MCG/MIN: 900 INJECTION, SOLUTION INTRAVENOUS at 14:48

## 2021-08-11 RX ADMIN — FENTANYL CITRATE 50 MCG: 50 INJECTION, SOLUTION INTRAMUSCULAR; INTRAVENOUS at 12:41

## 2021-08-11 RX ADMIN — ROPIVACAINE HYDROCHLORIDE 20 ML: 5 INJECTION, SOLUTION EPIDURAL; INFILTRATION; PERINEURAL at 12:44

## 2021-08-11 RX ADMIN — ACETAMINOPHEN 650 MG: 325 TABLET ORAL at 23:47

## 2021-08-11 RX ADMIN — SUCCINYLCHOLINE CHLORIDE 140 MG: 20 INJECTION, SOLUTION INTRAMUSCULAR; INTRAVENOUS at 14:16

## 2021-08-11 RX ADMIN — Medication 80 MCG: at 14:39

## 2021-08-11 RX ADMIN — LIDOCAINE HYDROCHLORIDE 100 MG: 20 INJECTION, SOLUTION INTRAVENOUS at 14:13

## 2021-08-11 RX ADMIN — FENTANYL CITRATE 50 MCG: 50 INJECTION, SOLUTION INTRAMUSCULAR; INTRAVENOUS at 17:06

## 2021-08-11 RX ADMIN — FENTANYL CITRATE 25 MCG: 50 INJECTION, SOLUTION INTRAMUSCULAR; INTRAVENOUS at 18:30

## 2021-08-11 RX ADMIN — ONDANSETRON HYDROCHLORIDE 4 MG: 2 INJECTION, SOLUTION INTRAMUSCULAR; INTRAVENOUS at 16:27

## 2021-08-11 RX ADMIN — Medication 80 MCG: at 14:28

## 2021-08-11 NOTE — PERIOP NOTES
INTRAOPERATIVE IRRIGATION - NACL 1000 ML LOT #[id number], EXP 06/2024,   3000 ML NACL  BAG LOT # [id number], EXP 03/2023

## 2021-08-11 NOTE — H&P
PRE-OP HISTORY AND PHYSICAL    Subjective:     Patient is a 60 y.o.  female presented with a history of left knee pain. Onset of symptoms was gradual with gradually worsening course since that time. She is being admitted for surgical management of this condition. The indications for the procedure include left knee severe osteoarthritis unresponsive to conservative treatment and affecting daily activities. Patient Active Problem List    Diagnosis Date Noted    DJD (degenerative joint disease) of knee 05/10/2011     Past Medical History:   Diagnosis Date    Arrhythmia 2021    svt, afib    Cancer ([facility] [address]     basal skin cancer, face    GERD (gastroesophageal reflux disease)     Hypotension     Nausea & vomiting     ostearthritis     RHA (rheumatoid arthritis) ([facility] [address]       Past Surgical History:   Procedure Laterality Date    HX APPENDECTOMY      HX CHOLECYSTECTOMY      1 year ago    HX HYSTERECTOMY      HX ORTHOPAEDIC      bi-lateral pins to knees    HX TONSILLECTOMY      HX WISDOM TEETH EXTRACTION      [place] TOTAL KNEE ARTHROPLASTY  5/9/2011    Right      Prior to Admission medications    Medication Sig Start Date End Date Taking? Authorizing Provider   ibuprofen (MOTRIN) 800 mg tablet Take 800 mg by mouth every eight (8) hours as needed for Pain. Provider, Historical   folic acid (FOLVITE) 1 mg tablet Take 1 mg by mouth daily. 2/10/21   Provider, Historical   methotrexate (RHEUMATREX) 2.5 mg tablet Take 2.5 mg by mouth every Wednesday. Wednesdays 6/19/21   Provider, Historical   predniSONE (DELTASONE) 5 mg tablet Take 10 mg by mouth as needed. Pain left knee 6/17/21   Provider, Historical   metoprolol succinate (TOPROL-XL) 25 mg XL tablet Take 1 Tablet by mouth daily. 6/15/21   Vito Chacon., [place] DELIA   melatonin 5 mg cap capsule Take 5 mg by mouth nightly as needed (for sleep aid). Provider, Historical   etanercept (EnbreL SureClick) 50 mg/mL (1 mL) injection 50 mg every Sunday.  Sundays 4/24/20   Provider, Historical   cyclobenzaprine (FLEXERIL) 10 mg tablet Take 10 mg by mouth nightly as needed. 4/28/11   Provider, Historical     Allergies   Allergen Reactions    Morphine Shortness of Breath      Social History     Tobacco Use    Smoking status: Never Smoker    Smokeless tobacco: Never Used   Substance Use Topics    Alcohol use: Yes     Alcohol/week: 1.0 standard drinks     Types: 1 Glasses of wine per week      Family History   Problem Relation Age of Onset    Other Mother         tachycardia    Stroke Father     Heart Failure Father 80    Microhematuria Maternal Grandfather 64      Review of Systems  A comprehensive review of systems was negative except for that written in the HPI. Objective:     No data found. There were no vitals taken for this visit. General:  Alert, cooperative, no distress, appears stated age. Head:  Normocephalic, without obvious abnormality, atraumatic. Eyes:  Conjunctivae/corneas clear. PERRL, EOMs intact. Fundi benign. Ears:  Normal TMs and external ear canals both ears. Nose: Nares normal. Septum midline. Mucosa normal. No drainage or sinus tenderness. Throat: Lips, mucosa, and tongue normal. Teeth and gums normal.   Neck: Supple, symmetrical, trachea midline, no adenopathy, thyroid: no enlargement/tenderness/nodules, no carotid bruit and no JVD. Back:   Symmetric, no curvature. ROM normal. No CVA tenderness. Lungs:   Clear to auscultation bilaterally. Chest wall:  No tenderness or deformity. Heart:  Regular rate and rhythm, S1, S2 normal, no murmur, click, rub or gallop. Breast Exam:  No tenderness, masses, or nipple abnormality. Abdomen:   Soft, non-tender. Bowel sounds normal. No masses,  No organomegaly. Genitalia:  Normal female without lesion, discharge or tenderness. Rectal:  Normal tone,  no masses or tenderness  Guaiac negative stool. Extremities: Extremities normal, atraumatic, no cyanosis or edema.  Left knee tender to rom ,nvi   Pulses: 2+ and symmetric all extremities. Skin: Skin color, texture, turgor normal. No rashes or lesions. Lymph nodes: Cervical, supraclavicular, and axillary nodes normal.   Neurologic: CNII-XII intact. Normal strength, sensation and reflexes throughout. Imaging Review  Left knee severe osteoarthritis    Assessment:     Active Problems:    * No active hospital problems. *      Plan:     The various methods of treatment have been discussed with the patient and family. After consideration of risks, benefits and other options for treatment, the patient has consented to surgical interventions (left total knee arthroplsaty). Questions were answered and Pre-op teaching was done by staff.

## 2021-08-11 NOTE — ANESTHESIA POSTPROCEDURE EVALUATION
Procedure(s):  LEFT  TOTAL KNEE ARTHROPLASTY WITH ELLIOTT. general, regional, total IV anesthesia    Anesthesia Post Evaluation        Patient location during evaluation: PACU  Note status: Adequate. Level of consciousness: responsive to verbal stimuli and sleepy but conscious  Pain management: satisfactory to patient  Airway patency: patent  Anesthetic complications: no  Cardiovascular status: acceptable  Respiratory status: acceptable  Hydration status: acceptable  Comments: +Post-Anesthesia Evaluation and Assessment    Patient: Ann Becerra MRN: [id number]  SSN: [id number]   YOB: 1965  Age: 60 y.o. Sex: female      Cardiovascular Function/Vital Signs    /66   Pulse 100   Temp 36.4 °C (97.6 °F)   Resp 20   Ht 5' 6\" (1.676 m)   Wt 91.1 kg (200 lb 13.4 oz)   SpO2 96%   BMI 32.42 kg/m²     Patient is status post Procedure(s):  LEFT  TOTAL KNEE ARTHROPLASTY WITH ELLIOTT. Nausea/Vomiting: Controlled. Postoperative hydration reviewed and adequate. Pain:  Pain Scale 1: Numeric (0 - 10) (08/11/21 1800)  Pain Intensity 1: 2 (08/11/21 1800)   Managed. Neurological Status:   Neuro (WDL): Within Defined Limits (08/11/21 1734)   At baseline. Mental Status and Level of Consciousness: Arousable. Pulmonary Status:   O2 Device: Nasal cannula (08/11/21 1800)   Adequate oxygenation and airway patent. Complications related to anesthesia: None    Post-anesthesia assessment completed. No concerns. Signed By: Meenakshi Villareal MD    8/11/2021  Post anesthesia nausea and vomiting:  controlled      INITIAL Post-op Vital signs:   Vitals Value Taken Time   /66 08/11/21 1815   Temp 36.4 °C (97.6 °F) 08/11/21 1800   Pulse 78 08/11/21 1826   Resp 12 08/11/21 1826   SpO2 98 % 08/11/21 1826   Vitals shown include unvalidated device data.

## 2021-08-11 NOTE — PERIOP NOTES
4:00 PM    = 1 Bottle of IRRISEPT for PRN Irrigation used by MD Maverick Gibson during Pt. Procedure.    + REF. #: [id number]  + LOT. #: [id number]  + EXP.  Date: 03/31/2023

## 2021-08-11 NOTE — PERIOP NOTES
[facility] - DENIES S/S OF COVID - NO FEVER, COLD, COUGH, SOB, N/V, DIARRHEA. ... PRE-OP TCHING DONE - TCHING DONE REGARDING SCOP PATCH REMOVAL AND HAND WASHING. PT VERBALIZES UNDERSTANDING. 1239 - TIMEOUT DONE - DR. BARRETT AT BEDSIDE TO PLACE LEFT KNEE BLOCK. PT YVETTE WELL.  VSS.

## 2021-08-11 NOTE — ANESTHESIA PROCEDURE NOTES
Adductor canal block (L)    Start time: 8/11/2021 12:39 PM  End time: 8/11/2021 12:45 PM  Performed by: Avril George MD  Authorized by: Avril George MD       Pre-procedure: Indications: at surgeon's request and post-op pain management    Preanesthetic Checklist: patient identified, risks and benefits discussed, site marked, timeout performed, anesthesia consent given and patient being monitored    Timeout Time: 12:39 EDT          Block Type:   Block Type:   Adductor canal block  Laterality:  Left  Monitoring:  Continuous pulse ox, frequent vital sign checks, heart rate, responsive to questions and oxygen  Injection Technique:  Single shot  Procedures: ultrasound guided    Patient Position: supine  Prep: chlorhexidine    Location:  Mid thigh  Needle Type:  Stimuplex  Needle Gauge:  22 G  Needle Localization:  Ultrasound guidance  Medication Injected:  Ropivacaine (PF) (NAROPIN)(0.5%) 5 mg/mL injection, 20 mL  Med Admin Time: 8/11/2021 12:44 PM    Assessment:  Number of attempts:  1  Injection Assessment:  Incremental injection every 5 mL, negative aspiration for CSF, no paresthesia, local visualized surrounding nerve on ultrasound, negative aspiration for blood and no intravascular symptoms  Patient tolerance:  Patient tolerated the procedure well with no immediate complications

## 2021-08-11 NOTE — BRIEF OP NOTE
Brief Postoperative Note    Patient: Elias Rothman  YOB: 1965  MRN: [id number]    Date of Procedure: 8/11/2021     Pre-Op Diagnosis: LEFT KNEE OSTEOARTHRITIS    Post-Op Diagnosis: Same as preoperative diagnosis. Procedure(s):  LEFT  TOTAL KNEE ARTHROPLASTY WITH ELLIOTT    Surgeon(s): Johnye Hodgkins, MD    Surgical Assistant: Surg Asst-1: Deja Fernandes    Anesthesia: General     Estimated Blood Loss (mL): 641     Complications: None    Specimens:   ID Type Source Tests Collected by Time Destination   1 : PORTIONS OF LEFT FEMORAL AND [place] Button  -DISCARDED    Johnye Hodgkins, MD 8/11/2021 1459 Discarded        Implants:   Implant Name Type Inv.  Item Serial No.  Lot No. LRB No. Used Action   CEMENT BNE 20ML 41GM FULL DOSE PMMA W/ TOBRA M VISC RADPQ - SNA  CEMENT BNE 20ML 41GM FULL DOSE PMMA W/ TOBRA M VISC RADPQ NA [employer] [id number] Left 1 Implanted   BASEPLT TIB UNIV TRIATHLN 3 --  - SN/A  BASEPLT TIB UNIV TRIATHLN 3 --  N/A [employer] [id number] Left 1 Implanted   COMPONENT FEM SZ 2 L KNEE CRUCE RET NATASHA TRIATHLON - SN/A  COMPONENT FEM SZ 2 L KNEE CRUCE RET NATASHA TRIATHLON N/A [employer] [id number] Left 1 Implanted   IMPLANT PATELLAR [id number] [id number] X3 ASYMMETRIC TRIATHLON - SN/A  IMPLANT PATELLAR [id number] [id number] X3 ASYMMETRIC TRIATHLON N/A [employer] [id number] Left 1 Implanted   INSERT TIB CS 3 12 MM ARTC POST KNEE BEAR TECHNOLOGY X3 - SN/A  INSERT TIB CS 3 12 MM ARTC POST KNEE BEAR TECHNOLOGY X3 N/A [employer] [id number] Left 1 Implanted       Drains: * No LDAs found *    Findings: as above    Electronically Signed by Maria De Jesus Katz MD on 8/11/2021 at 4:44 PM

## 2021-08-11 NOTE — DISCHARGE INSTRUCTIONS
Discharge Instructions: How to [facility]  Surgery: Total Knee Replacement  Surgeon:   Lucius Gauthier MD  Surgery Date:  8/11/2021     To relieve pain:   Use ice/gel packs.  Put the ice pack directly over the wound, or anywhere you are hurting or swollen.  To control pain and swelling, keep ice on regularly, especially after physical activity.  The packs should stay cold for 3-4 hours. When it is not cold anymore, rotate with the packs in the freezer.  Elevate your leg. This will also keep swelling down.  Rest for at least 20 minutes between activity or exercises.  To keep track of your pain medications, write down what you take and when you take it.  The last dose of pain medication you got in the hospital was:       Medication    Dose    Date & Time           Choose your medications based on the pain scale below:     To keep your pain under control, take Tylenol every 6 hours for 14 days - even if you feel like you dont need it.  For mild to moderate pain (4-6 on pain scale), take one pain pill every 3-4 hours as needed.  For severe pain (7-10 on pain scale), take two pain pills every 3-4 hours as needed.  To prevent nausea, take your pain medications with food. Pain Scale                 As your pain lessens:     Slowly start taking less pain medication. You may do this by waiting longer between doses or by taking smaller doses.  Stop using the pain medications as soon as you no longer need it, usually in 2-3 weeks.  Aspirin   To prevent blood clots, you will need to take Aspirin 81 mg twice a day for 30 days.                To prevent stomach upset or bleeding:   Do not take non-steroidal anti-inflammatory medications (Ibuprofen, Advil, Motrin, Naproxen, etc.)    Take Pepcid 20 mg twice a day, or a similar home medication, while you are taking a blood thinner. OPSITE (Honeycomb dressing)     Keep your clear, waterproof dressing in place for 5-7 days after your leave the hospital.     If you are still having drainage, you will need to change your dressing in 5-7 days. You will be given one extra dressing to use at home.  If there is no more drainage from the wound, you may leave it open to the air.  You will have some swelling, warmth, and bruising around the knee and up and down the leg after surgery. This will may get worse in the first few days you are home and will slowly get better over the next few weeks. OPTIFOAM DRESSING INSTRUCTIONS                              PRINEO DRESSING INSTRUCTIONS     Prineo is a type of skin glue and mesh that is keeping your wound together.  Leave this covering alone. Do not peel it off.  Do not apply oils or lotions over it.  You may shower with this dressing but do not soak it. Gently pat your wound dry when you get out of the shower.  DO NOTs:   Do not rub your surgical wound   Do not put lotion or oils on your wound.  Do not take a tub bath or go swimming until your doctor says it is ok.  You may shower with this dressing over your wound.  After showering pat the dressing dry.  If you have staples a home health nurse will remove them in about 10 days.  To increase and promote healing:   Stop Smoking (or at least cut back on       Smoking).  Eat a well-balanced diet (high in protein       and vitamin C).  If you have a poor appetite, drink Ensure, Glucerna, or         Fletcher Instant Breakfast for the next 30 days.  If you are diabetic, you should control your blood         sugars to prevent infection and help your wound         to heal.                     f you have a poor appetite, drink Ensure, Glucerna, or Fletcher Instant Breakfast for the next 30 days.      If you are diabetic, you should control your blood                                                sugars to prevent infection and help your wound                                                to heal.     Prevent Infection    1. Wash your hands.  This is the most important thing you or your caregiver can do.  Wash your hands with soap and water (or use the hand  we gave you) before you touch any wounds. 2. Shower.  Use the antibacterial soap we gave you when you take a shower.  Shower with this soap until your wounds are healed. 3.  Use clean sheets.  Put freshly cleaned sheets on your bed after surgery.  To keep the surgery site clean, do not allow pets to sleep with you while your wound is still healing.  To prevent constipation, stay active and drink plenty of fluid.  While using pain medications, you should also take stool softeners and laxatives, such as Pericolace       and Miralax.  If you are having too many bowel       Movements, then you may need       to stop taking the laxatives.  You should have a bowel movement 3-4 days        after surgery and then at least every other day while       on pain medication.  To improve your recovery, you must be active!  Use your walker and take short walks (in your home)         about every 2 hours during the day.  Try to increase how far you walk each day.  You can put as much weight on your leg as you can tolerate while walking.  To avoid getting a stiff knee, work on getting your knee bent and straight as soon as possible.  Home health physical therapy will come to your       home the day after you leave the hospital.  The       therapist will visit about 4 times over the next        couple of weeks to teach you how to get out of        bed, to safely walk in your home, and to do your        exercises.  NO DRIVING until your surgeon tells you it is ok.      You can return to work when cleared by a physician.  Please call your physician immediately if you have:     Constant bleeding from your wound.  Increasing redness or swelling around your wound (Some warmth, bruising and swelling is normal).  Change in wound drainage (increase in amount, color, or bad smell).  Change in mental status (unusual behavior   Temperature over 101.5 degrees Fahrenheit      Pain or redness in the calf (back of your lower leg - see picture)     Increased swelling of the thigh, ankle, calf, or foot.  Emergency: CALL 911 if you have:     Shortness of breath     Chest pain when you cough or taking a deep breath     Please call your surgeons office at [phone] for a follow up appointment. _   You should call as soon as you get home or the next day after discharge. Ask to make an appointment in 2 weeks.  If you have questions or concerns during normal business hours, you may reach Dr. Satya Gibbons' Team at [phone].

## 2021-08-11 NOTE — PERIOP NOTES
TRANSFER - OUT REPORT:    Verbal report given to Lu (name) on Braxton Baldwin  being transferred to [facility]unit) for routine post - op       Report consisted of patients Situation, Background, Assessment and   Recommendations(SBAR). Information from the following report(s) SBAR, Kardex, OR Summary, Intake/Output and MAR was reviewed with the receiving nurse. Opportunity for questions and clarification was provided.       Patient transported with:   O2 @ 22 liters  Registered Nurse

## 2021-08-12 ENCOUNTER — HOME HEALTH ADMISSION (OUTPATIENT)
Dept: HOME HEALTH SERVICES | Facility: HOME HEALTH | Age: 56
End: 2021-08-12
Payer: COMMERCIAL

## 2021-08-12 VITALS
RESPIRATION RATE: 16 BRPM | OXYGEN SATURATION: 95 % | SYSTOLIC BLOOD PRESSURE: 97 MMHG | TEMPERATURE: 97.6 F | WEIGHT: 200.84 LBS | DIASTOLIC BLOOD PRESSURE: 58 MMHG | HEART RATE: 83 BPM | HEIGHT: 66 IN | BODY MASS INDEX: 32.28 KG/M2

## 2021-08-12 LAB
ANION GAP SERPL CALC-SCNC: 9 MMOL/L (ref 5–15)
BUN SERPL-MCNC: 14 MG/DL (ref 6–20)
BUN/CREAT SERPL: 16 (ref 12–20)
CALCIUM SERPL-MCNC: 8 MG/DL (ref 8.5–10.1)
CHLORIDE SERPL-SCNC: 108 MMOL/L (ref 97–108)
CO2 SERPL-SCNC: 19 MMOL/L (ref 21–32)
CREAT SERPL-MCNC: 0.85 MG/DL (ref 0.55–1.02)
GLUCOSE SERPL-MCNC: 179 MG/DL (ref 65–100)
HGB BLD-MCNC: 11.6 G/DL (ref 11.5–16)
POTASSIUM SERPL-SCNC: 4.1 MMOL/L (ref 3.5–5.1)
SODIUM SERPL-SCNC: 136 MMOL/L (ref 136–145)

## 2021-08-12 PROCEDURE — 36415 COLL VENOUS BLD VENIPUNCTURE: CPT

## 2021-08-12 PROCEDURE — 74011250636 HC RX REV CODE- 250/636: Performed by: ORTHOPAEDIC SURGERY

## 2021-08-12 PROCEDURE — 99218 HC RM OBSERVATION: CPT

## 2021-08-12 PROCEDURE — 85018 HEMOGLOBIN: CPT

## 2021-08-12 PROCEDURE — 74011000250 HC RX REV CODE- 250: Performed by: ORTHOPAEDIC SURGERY

## 2021-08-12 PROCEDURE — 74011250637 HC RX REV CODE- 250/637: Performed by: ORTHOPAEDIC SURGERY

## 2021-08-12 PROCEDURE — 97530 THERAPEUTIC ACTIVITIES: CPT

## 2021-08-12 PROCEDURE — 80048 BASIC METABOLIC PNL TOTAL CA: CPT

## 2021-08-12 PROCEDURE — 94760 N-INVAS EAR/PLS OXIMETRY 1: CPT

## 2021-08-12 PROCEDURE — 97161 PT EVAL LOW COMPLEX 20 MIN: CPT

## 2021-08-12 PROCEDURE — 97116 GAIT TRAINING THERAPY: CPT

## 2021-08-12 RX ORDER — ONDANSETRON 4 MG/1
4 TABLET, ORALLY DISINTEGRATING ORAL
Qty: 15 TABLET | Refills: 0 | Status: SHIPPED | OUTPATIENT
Start: 2021-08-12 | End: 2021-08-17

## 2021-08-12 RX ORDER — FAMOTIDINE 20 MG/1
20 TABLET, FILM COATED ORAL 2 TIMES DAILY
Qty: 60 TABLET | Refills: 0 | Status: SHIPPED | OUTPATIENT
Start: 2021-08-12 | End: 2021-09-11

## 2021-08-12 RX ORDER — AMOXICILLIN 250 MG
1 CAPSULE ORAL 2 TIMES DAILY
Qty: 14 TABLET | Refills: 0 | Status: SHIPPED | OUTPATIENT
Start: 2021-08-12 | End: 2021-08-19

## 2021-08-12 RX ORDER — ACETAMINOPHEN 325 MG/1
650 TABLET ORAL EVERY 6 HOURS
Qty: 56 TABLET | Refills: 0 | Status: SHIPPED | OUTPATIENT
Start: 2021-08-12 | End: 2021-08-19

## 2021-08-12 RX ORDER — OXYCODONE HYDROCHLORIDE 5 MG/1
5-10 TABLET ORAL
Qty: 40 TABLET | Refills: 0 | Status: SHIPPED | OUTPATIENT
Start: 2021-08-12 | End: 2021-08-19

## 2021-08-12 RX ORDER — ASPIRIN 81 MG/1
81 TABLET ORAL 2 TIMES DAILY
Qty: 60 TABLET | Refills: 0 | Status: SHIPPED
Start: 2021-08-12 | End: 2021-09-11

## 2021-08-12 RX ORDER — POLYETHYLENE GLYCOL 3350 17 G/17G
17 POWDER, FOR SOLUTION ORAL DAILY
Qty: 7 PACKET | Refills: 0 | Status: SHIPPED | OUTPATIENT
Start: 2021-08-13 | End: 2021-08-20

## 2021-08-12 RX ADMIN — ACETAMINOPHEN 650 MG: 325 TABLET ORAL at 07:07

## 2021-08-12 RX ADMIN — ASPIRIN 81 MG: 81 TABLET, COATED ORAL at 09:01

## 2021-08-12 RX ADMIN — OXYCODONE HYDROCHLORIDE 5 MG: 5 TABLET ORAL at 10:08

## 2021-08-12 RX ADMIN — FAMOTIDINE 20 MG: 20 TABLET ORAL at 09:01

## 2021-08-12 RX ADMIN — SODIUM CHLORIDE 125 ML/HR: 9 INJECTION, SOLUTION INTRAVENOUS at 11:42

## 2021-08-12 RX ADMIN — KETOROLAC TROMETHAMINE 30 MG: 30 INJECTION, SOLUTION INTRAMUSCULAR; INTRAVENOUS at 07:08

## 2021-08-12 RX ADMIN — OXYCODONE HYDROCHLORIDE 10 MG: 5 TABLET ORAL at 13:18

## 2021-08-12 RX ADMIN — Medication 10 ML: at 07:08

## 2021-08-12 RX ADMIN — CEFAZOLIN SODIUM 2 G: 1 INJECTION, POWDER, FOR SOLUTION INTRAMUSCULAR; INTRAVENOUS at 07:08

## 2021-08-12 RX ADMIN — Medication 10 ML: at 11:42

## 2021-08-12 RX ADMIN — DOCUSATE SODIUM 50MG AND SENNOSIDES 8.6MG 1 TABLET: 8.6; 5 TABLET, FILM COATED ORAL at 09:01

## 2021-08-12 RX ADMIN — OXYCODONE HYDROCHLORIDE 10 MG: 5 TABLET ORAL at 03:23

## 2021-08-12 RX ADMIN — ACETAMINOPHEN 650 MG: 325 TABLET ORAL at 13:18

## 2021-08-12 RX ADMIN — METOPROLOL SUCCINATE 25 MG: 25 TABLET, EXTENDED RELEASE ORAL at 09:01

## 2021-08-12 RX ADMIN — POLYETHYLENE GLYCOL 3350 17 G: 17 POWDER, FOR SOLUTION ORAL at 09:01

## 2021-08-12 RX ADMIN — OXYCODONE HYDROCHLORIDE 5 MG: 5 TABLET ORAL at 07:08

## 2021-08-12 NOTE — PROGRESS NOTES
DISCHARGE NOTE FROM [facility]    Patient determined to be stable for discharge by attending provider. I have reviewed the discharge instructions with the patient. They verbalized understanding and all questions were answered to their satisfaction. No complaints or further questions were expressed. Medications sent to pharmacy. Appropriate educational materials and medication side effect teaching were provided. PIV were removed prior to discharge. Patient did not discharge with any line, crandall, or drain.      Personal items and valuables accounted for at discharge by patient and/or family: YES    Post-op patient: No      Winsome Mi RN

## 2021-08-12 NOTE — OP NOTES
[address]  OPERATIVE REPORT    Name:  Chelo Hassan  MR#:  [id number]  :  1965  ACCOUNT #:  [de-identified]  DATE OF SERVICE:  2021    PREOPERATIVE DIAGNOSIS:  Left knee severe end-stage osteoarthritis. POSTOPERATIVE DIAGNOSIS:  Left knee severe end-stage osteoarthritis. PROCEDURE PERFORMED:  Left total knee arthroplasty with Placido. SURGEON:  Genoveva Moreau MD    ASSISTANT:  None. ANESTHESIA:  General.    COMPLICATIONS:  None. SPECIMENS REMOVED:  None. IMPLANTS:  See note. ESTIMATED BLOOD LOSS:  200 mL. DISPOSITION:  Recovery room, stable. INDICATIONS:  This is a 64-year-old female status post bilateral ACL reconstructions done open many many years ago, who subsequently developed a severe bilateral osteoarthritis. She is status post right total knee arthroplasty in the past and comes in today for left total knee arthroplasty. She failed conservative management and her pain progressed to the point that normal daily activities were severely impacted. The risks, benefits, and alternatives were explained in detail and she agreed to proceed. PROCEDURE:  The patient was taken to the operating room and laid supine on table. General anesthetic was administered. Ancef was given preoperatively per protocol. Tourniquet was applied to the left thigh above the knee. The knee was then prepped and draped free in usual sterile fashion and time-out was called. Following time-out, incision was carried out through the prior incision which was a long curvilinear incision along a roughly medial approach. Dissection was carried down through the subcutaneous layers to the patellar tendon and quad tendon. A medial approach was then utilized. A medial release was carried out as well. The patella was everted, 10 mm resected off the underside. Following this, femoral and tibial checkpoints were placed.   Two pins were then placed in the proximal tibia and distal femur, and connected to the appropriate data arrays. The knee was then digitized. Osteophytes were removed and the knee was balanced in flexion and extension. She was hyperextending so the distal cut was subtracted by 1 mm to help account for that. After the cuts were verified, the [facility] robot was brought in and proximal tibia and distal femoral cuts were made. The lamina  was then placed in medial and lateral menisci. The medial and lateral menisci were removed and the posterior capsule was cauterized. The posterior capsule was infiltrated with Marcaine solution as well. The knee was then trialed with a 3-tibia with a 11-insert and a 2-femur, which gave slightly good stability with hyperextension of about 2 mm. The tray was pinned in place and lug holes were drilled for the femur, which was then removed as was the insert. A guide was used to drill for the universal tibial tray and the screw from prior ACL was blocking that drill. The screw was then removed using a cruciform screwdriver from the [employer] Universal screw removal set. The reamer was then passed normally and keel punch   was used to further prepare the proximal tibia. At this point, the limb was exsanguinated, tourniquet inflated to 275 mmHg and after copious irrigation and drying, a size-3 universal tibial baseplate was cemented into place using third-generation mixed tobramycin cement. A size-2 cruciate retaining femur was cemented into place and then a trial-11 was placed. Excess cement was removed along the way. The patella was drilled for 29-cemented patella which was cemented into place as well. The cement was then allowed to dry or to harden, that is, as the wound was irrigated with Irrisept.   Once the cement had hardened, the wound was irrigated further with pulsatile lavage with normal saline and a size 12 x 3 tibial CS insert was placed, which again gave excellent stability with a very slight hyperextension of about 2 degrees. .  The wound was further irrigated and closed. The checkpoints were removed and then the wound was closed with interrupted #1 Vicryl in the fascia. The subcutaneous was closed with 2-0 Vicryl and the skin with a running subcuticular and then Dermabond in the skin. The pins were removed from the distal femur and proximal tibia, and those wounds were irrigated and closed with interrupted nylon. The Marcaine solution was injected deep and superficial to the capsule on the main incision. Sterile dressings were applied at the end. The patient tolerated the procedure well without immediate complication.       MD ADRIENNE [id number]  D:  08/11/2021 16:50  T:  08/11/2021 22:51  JOB #:  [id number]

## 2021-08-12 NOTE — PROGRESS NOTES
Transition of Care Plan:  RUR: n/a obs status   Disposition: home with home health- [facility]  Follow up appointments: ortho  DME needed: pt owns dme needed for d/c  Transportation at Discharge: sister- at bedside   Keys or means to access home: yes  IM Medicare Letter: n/a commercial insurance   Is patient a BCPI-A Bundle: no   If yes, was Bundle Letter given?:     Caregiver Contact: sister Gayatri Julien [phone]  Discharge Caregiver contacted prior to discharge? Yes    Clear for d/c from CM standpoint            Reason for Admission: left total knee arthroplasty                     RUR Score: n/a obs status                     Plan for utilizing home health: per recommendation         PCP: First and Last name:  Kath Rodriguez NP   Name of Practice: [facility]    Are you a current patient: Yes/No: yes   Approximate date of last visit: about 1 year    Can you participate in a virtual visit with your PCP: yes                    Current Advanced Directive/Advance Care Plan: L[address] 13 (ACP) Conversation      Date of Conversation: 8/12/21  Conducted with: Patient with Decision Making Capacity    Healthcare Decision Maker:     Click here to complete [employer] including selection of the Healthcare Decision Maker Relationship (ie \"Primary\")  Today we documented Decision Maker(s). The patient will provide ACP documents. Content/Action Overview: Has ACP document(s) NOT on file - requested patient to provide  Reviewed DNR/DNI and patient elects Full Code (Attempt Resuscitation)    Healthcare Decision Maker:   Click here to complete [employer] including selection of the Healthcare Decision Maker Relationship (ie \"Primary\")                         Transition of Care Plan:                      CM made room visit with patient who was alert and oriented. Pt confirmed demographics, insurance, and emergency contact on file.  Pt lives alone in a single level home with 3 jose. Pt has a RW at home. At baseline pt is independent with ADLs and driving. Pt has used HH about 10 years ago and unable to recall agency name. Pt has no hx or SNF or IPR. Pt's plan is to d/c home with home health. Pt reported that her sister will be staying with her until Saturday then she has friends lined up to stay with her through all next week as well. Pt requested to use [facility] for [facility] services. Referral sent to [facility] and they have accepted AVS updated. Pt's sister to provide transportation home. Oral and Written notification given to patient and/or caregiver informing them that they are currently an Outpatient receiving care in our facility. Outpatient services include Observation [facility]. Care Management Interventions  PCP Verified by CM: Yes (about 1 year ago )  Mode of Transport at Discharge:  Other (see comment) (sister)  Transition of Care Consult (CM Consult): Discharge Planning, [address]: Yes  Discharge Durable Medical Equipment: No  Physical Therapy Consult: Yes  Occupational Therapy Consult: No  Speech Therapy Consult: No  Current Support Network: Lives Alone, Family Lives Nearby, Own Home (pt lives alone in a single level home with 3 jose)  Confirm Follow Up Transport: Family  Discharge Location  Discharge Placement: Home with home health    Romario Cortes, [address]

## 2021-08-12 NOTE — PROGRESS NOTES
3:33 AM  Patient ambulated to bathroom with RN, patient became nauseous and orthostatic while on toilet BP 81/42, lift team called to assist patient BP returned to 113/78before attempting to move patient. Patient assisted back to bed via wheelchair. /69 after returning to bed. Will draw labs at this time, patient has fluids going at 125/hr.

## 2021-08-12 NOTE — PROGRESS NOTES
PHYSICAL THERAPY EVALUATION/DISCHARGE  Patient: Lazarus Medrano (49 y.o. female)  Date: 8/12/2021  Primary Diagnosis: Osteoarthritis of left knee, unspecified osteoarthritis type [M17.12]  Procedure(s) (LRB):  LEFT  TOTAL KNEE ARTHROPLASTY WITH ELLIOTT (Left) 1 Day Post-Op   Precautions: WBAT         ASSESSMENT  Based on the objective data described below, the patient presents with expected L knee post op pain, impaired gait mechanics, low BP(not symptomatic), and decreased activity tolerance. Pt received in bed on arrival, sister present. Pt eager to discharge today. Pt demonstrates all mobility at mod I this session with use of a rolling walker. Stair navigation and car transfer training complete. Pt knowledgeable of the TKR HEP and with good return of all exercises. Pt has cleared PT and is good to discharge home with HHPT. Functional Outcome Measure: The patient scored 90/100 on the Barthel Index outcome measure which is indicative of 10% impaired function/adls. Other factors to consider for discharge: moving well, will have support at home     Further skilled acute physical therapy is not indicated at this time. PLAN :  Recommendation for discharge: (in order for the patient to meet his/her long term goals)  Physical therapy at least 2 days/week in the home     This discharge recommendation:  Has been made in collaboration with the attending provider and/or case management    IF patient discharges home will need the following DME: patient owns DME required for discharge       SUBJECTIVE:   Patient stated I don't feel like I did last night.     OBJECTIVE DATA SUMMARY:   HISTORY:    Past Medical History:   Diagnosis Date    Arrhythmia 2021    svt, afib    Cancer ([facility] [address]     basal skin cancer, face    GERD (gastroesophageal reflux disease)     Hypotension     Nausea & vomiting     ostearthritis     RHA (rheumatoid arthritis) ([facility] [address]      Past Surgical History:   Procedure Laterality Date  HX APPENDECTOMY      HX CHOLECYSTECTOMY      1 year ago    HX HYSTERECTOMY      HX ORTHOPAEDIC      bi-lateral pins to knees    HX TONSILLECTOMY      HX WISDOM TEETH EXTRACTION      NM TOTAL KNEE ARTHROPLASTY  5/9/2011    Right       Prior level of function: I, antalgic gait  Personal factors and/or comorbidities impacting plan of care: none    Home Situation  Home Environment: Private residence  # Steps to Enter: 3  Rails to Enter: Yes  Hand Rails : Bilateral  One/Two Story Residence: One story  Living Alone: Yes  Support Systems: Family member(s)  Patient Expects to be Discharged to[de-identified] House  Current DME Used/Available at Home: Cane, straight, Grab bars, Walker, rolling  Tub or Shower Type: Shower    EXAMINATION/PRESENTATION/DECISION MAKING:   Critical Behavior:         Hearing: Auditory  Auditory Impairment: None  Hearing Aids/Status: Does not own     Range Of Motion:  AROM: Within functional limits (Left knee decreased but functional)            Strength:    Strength: Within functional limits (L knee decreased but functional)         Tone & Sensation:   Tone: Normal     Sensation: Intact          Coordination:  Coordination: Within functional limits       Functional Mobility:  Bed Mobility:  Rolling: Modified independent  Supine to Sit: Modified independent  Sit to Supine: Modified independent  Scooting: Modified independent  Transfers:  Sit to Stand: Modified independent  Stand to Sit: Modified independent           Balance:   Sitting: Intact  Standing: Intact; With support  Ambulation/Gait Training:  Distance (ft): 150 Feet (ft)  Assistive Device: Gait belt;Walker, rolling  Ambulation - Level of Assistance: Modified independent     Gait Description (WDL): Exceptions to WDL  Gait Abnormalities: Decreased step clearance; Step to gait (progressed to step through gait with time)     Left Side Weight Bearing: As tolerated  Base of Support: Widened  Stance: Left decreased  Speed/Monique: Pace decreased (<100 feet/min)  Step Length: Left shortened;Right shortened  Swing Pattern: Left asymmetrical       Stairs:  Number of Stairs Trained: 4  Stairs - Level of Assistance: Modified independent   Rail Use: Both      Functional Measure:  Barthel Index:    Bathin  Bladder: 10  Bowels: 10  Groomin  Dressin  Feeding: 10  Mobility: 15  Stairs: 10  Toilet Use: 10  Transfer (Bed to Chair and Back): 15  Total: 90/100       The Barthel ADL Index: Guidelines  1. The index should be used as a record of what a patient does, not as a record of what a patient could do. 2. The main aim is to establish degree of independence from any help, physical or verbal, however minor and for whatever reason. 3. The need for supervision renders the patient not independent. 4. A patient's performance should be established using the best available evidence. Asking the patient, friends/relatives and nurses are the usual sources, but direct observation and common sense are also important. However direct testing is not needed. 5. Usually the patient's performance over the preceding 24-48 hours is important, but occasionally longer periods will be relevant. 6. Middle categories imply that the patient supplies over 50 per cent of the effort. 7. Use of aids to be independent is allowed. Kathy Talbot., Barthel, D.W. (8013). Functional evaluation: the Barthel Index. [facility] (14)2. BRYAN Mosqueda, Khoi Fajardo., Enedina Henriquez., Kayleigh [facility], [address] [place] [address] (). Measuring the change indisability after inpatient rehabilitation; comparison of the responsiveness of the Barthel Index and Functional [place] Measure. Journal of Neurology, Neurosurgery, and Psychiatry, 66(4), 424-416. Aracelis Garcia, N.J.A, SATYA Eisenberg, & Wing Richardson, MHermelindoA. (2004.) Assessment of post-stroke quality of life in cost-effectiveness studies: The usefulness of the Barthel Index and the EuroQoL-5D.  Quality of Life Research, 13, 008-53          Physical Therapy Evaluation Charge Determination   History Examination Presentation Decision-Making   LOW Complexity : Zero comorbidities / personal factors that will impact the outcome / POC LOW Complexity : 1-2 Standardized tests and measures addressing body structure, function, activity limitation and / or participation in recreation  LOW Complexity : Stable, uncomplicated  LOW Complexity : FOTO score of       Based on the above components, the patient evaluation is determined to be of the following complexity level: LOW     Pain Rating:  3/10    Activity Tolerance:   Good      After treatment patient left in no apparent distress:   Supine in bed, Call bell within reach, Caregiver / family present and Side rails x 3    COMMUNICATION/EDUCATION:   The patients plan of care was discussed with: Case management and Nurse practitioner. Fall prevention education was provided and the patient/caregiver indicated understanding.     Thank you for this referral.  Daphnie Haque, PT   Time Calculation: 42 mins

## 2021-08-12 NOTE — PROGRESS NOTES
Ortho / Neurosurgery NP Note    POD# 1 s/p LEFT  TOTAL KNEE ARTHROPLASTY WITH ELLIOTT   Pt seen with visitor at bedside. Pt resting in bed, in NAD. Reports post-op knee pain 7/10 and \"tolerable\" controlled with oxycodone   Ambulated to bathroom around 3am, orthostatic and symptomatic   Encouraged to sit up/HOB elevated until therapy eval this morning   Tolerating regular diet. No nausea. VSS Afebrile. Room air. Visit Vitals  BP (!) 117/56   Pulse 93   Temp 97.4 °F (36.3 °C)   Resp 14   Ht 5' 6\" (1.676 m)   Wt 91.1 kg (200 lb 13.4 oz)   SpO2 100%   BMI 32.42 kg/m²       Voiding status: voiding   Output (mL)  Last Bowel Movement Date: 08/11/21 (08/11/21 1159)      Labs    Lab Results   Component Value Date/Time    HGB 11.6 08/12/2021 04:01 AM      Lab Results   Component Value Date/Time    INR 1.1 08/03/2021 08:44 AM      Lab Results   Component Value Date/Time    Sodium 136 08/12/2021 04:01 AM    Potassium 4.1 08/12/2021 04:01 AM    Chloride 108 08/12/2021 04:01 AM    CO2 19 (L) 08/12/2021 04:01 AM    Glucose 179 (H) 08/12/2021 04:01 AM    BUN 14 08/12/2021 04:01 AM    Creatinine 0.85 08/12/2021 04:01 AM    Calcium 8.0 (L) 08/12/2021 04:01 AM     Recent Glucose Results:   Lab Results   Component Value Date/Time     (H) 08/12/2021 04:01 AM           Body mass index is 32.42 kg/m². : A BMI > 30 is classified as obesity and > 40 is classified as morbid obesity. Primaseal dressing c.d.i - bandaids underlying dressing, will change prior to d/c  Cryotherapy in place over incision  Calves soft and supple; No pain with passive stretch  Sensation and motor intact - PF/DF/EHL intact   SCDs for mechanical DVT proph while in bed     PLAN:  1) PT BID - WBAT. 2) Aspirin 81 mg PO BID for DVT Prophylaxis   3) GI Prophylaxis - Pepcid  4) Orthostatic hypotension - continue IV fluids. Continue metoprolol for hx SVT and Afib. Remove scopolamine patch. Monitor VS with therapy.     5) Readniess for discharge: [x] Vital Signs stable    [x] Hgb stable    [x] + Voiding    [x] Wound intact, drainage minimal    [x] Tolerating PO intake     [] Cleared by PT (OT if applicable)     [] Stair training completed (if applicable)    [] Independent / Contact Guard Assist (household distance)     [] Bed mobility     [] Car transfers     [] ADLs    [x] Adequate pain control on oral medication alone     Discharge pending therapy clearance and stable BP with activity. Plans to return home with family's support and HH. Addendum @ 11:30 - Bp soft but stable throughout therapy session, cleared from PT/OT standpoint for d/c, pt denies dizziness, palpitations, or SOB. Pain well controlled. Comfortable with returning home today with family support. Medically stable for discharge.      Francia Cade NP

## 2021-08-12 NOTE — DISCHARGE SUMMARY
Ortho Discharge Summary    Patient ID:  Kalyani Rice  [id number]  female  60 y.o.  1965    Admit date: 2021    Discharge date: 2021    Admitting Physician: Katy Luna MD     Consulting Physician(s):   Treatment Team: Attending Provider: Donnie Clemens MD; Utilization Review: Francisca Mejia; Care Manager: Amado Sever    Date of Surgery:   2021     Preoperative Diagnosis:  LEFT KNEE OSTEOARTHRITIS    Postoperative Diagnosis:   LEFT KNEE OSTEOARTHRITIS    Procedure(s):   LEFT  TOTAL KNEE ARTHROPLASTY WITH ELLIOTT     Anesthesia Type:   General     Surgeon: Donnie Clemens MD                            HPI:  Pt is a 60 y.o. female who has a history of LEFT KNEE OSTEOARTHRITIS  with pain and limitations of activities of daily living who presents at this time for a LEFT  TOTAL KNEE ARTHROPLASTY WITH ELLIOTT following the failure of conservative management. PMH:   Past Medical History:   Diagnosis Date    2021    svt, afib    Cancer ([address]     basal skin cancer, face    GERD (gastroesophageal reflux disease)     Hypotension     Nausea & vomiting     ostearthritis     RHA (rheumatoid arthritis) (HCC)        Body mass index is 32.42 kg/m². : A BMI > 30 is classified as obesity and > 40 is classified as morbid obesity. Medications upon admission :   Prior to Admission Medications   Prescriptions Last Dose Informant Patient Reported? Taking? cyclobenzaprine (FLEXERIL) 10 mg tablet 2021  Yes No   Sig: Take 10 mg by mouth nightly as needed. etanercept (EnbreL SureClick) 50 mg/mL (1 mL) injection 2021  Yes No   Si mg every . Sundays   folic acid (FOLVITE) 1 mg tablet 2021  Yes No   Sig: Take 1 mg by mouth daily. ibuprofen (MOTRIN) 800 mg tablet 8/3/2021  Yes No   Sig: Take 800 mg by mouth every eight (8) hours as needed for Pain.   melatonin 5 mg cap capsule 8/3/2021  Yes No   Sig: Take 5 mg by mouth nightly as needed (for sleep aid). methotrexate (RHEUMATREX) 2.5 mg tablet 7/28/2021  Yes No   Sig: Take 2.5 mg by mouth every Wednesday. Wednesdays   metoprolol succinate (TOPROL-XL) 25 mg XL tablet 8/11/2021 at 0800  No Yes   Sig: Take 1 Tablet by mouth daily. predniSONE (DELTASONE) 5 mg tablet 7/11/2021 at Unknown time  Yes Yes   Sig: Take 10 mg by mouth as needed. Pain left knee      Facility-Administered Medications: None        Allergies: Allergies   Allergen Reactions    Morphine Shortness of Breath        Hospital Course: The patient underwent surgery. Complications:  None; patient tolerated the procedure well. Was taken to the PACU in stable condition and then transferred to the ortho floor. Perioperative Antibiotics:  Ancef     Postoperative Pain Management:  Oxycodone & Tylenol     DVT Prophylaxis: Aspirin 81 BID    Postoperative transfusions:    Number of units banked PRBCs =   none     Post Op complications: none    Hemoglobin at discharge:    Lab Results   Component Value Date/Time    HGB 11.6 08/12/2021 04:01 AM    INR 1.1 08/03/2021 08:44 AM       Dressing was changed on POD # 1, Optifoam applied for discharge. Incision - clean, dry and intact. No significant erythema or swelling. Wound appears to be healing without any evidence of infection. Neurovascular exam found to be within normal limits. Physical Therapy started following surgery and participated in bed mobility, transfers and ambulation. Discharged to: Home with [facility]. Condition on Discharge:   stable    Discharge instructions:  - Anticoagulate with Aspirin 81 BID  - Take pain medications as prescribed  - Resume pre hospital diet      - Discharge activity: activity as tolerated  - Ambulate with assistive device as needed. - Weight bearing status - WBAT  - Wound Care Keep wound clean and dry. See discharge instruction sheet.             -DISCHARGE MEDICATION LIST     Current Discharge Medication List      START taking these medications    Details acetaminophen (TYLENOL) 325 mg tablet Take 2 Tablets by mouth every six (6) hours for 7 days. Qty: 56 Tablet, Refills: 0  Start date: 8/12/2021, End date: 8/19/2021      aspirin delayed-release 81 mg tablet Take 1 Tablet by mouth two (2) times a day for 30 days. Qty: 60 Tablet, Refills: 0  Start date: 8/12/2021, End date: 9/11/2021      famotidine (PEPCID) 20 mg tablet Take 1 Tablet by mouth two (2) times a day for 30 days. Qty: 60 Tablet, Refills: 0  Start date: 8/12/2021, End date: 9/11/2021      oxyCODONE IR (ROXICODONE) 5 mg immediate release tablet Take 1-2 Tablets by mouth every four (4) hours as needed for Pain for up to 7 days. Max Daily Amount: 60 mg.  Qty: 40 Tablet, Refills: 0  Start date: 8/12/2021, End date: 8/19/2021    Associated Diagnoses: Status post total left knee replacement      polyethylene glycol (MIRALAX) 17 gram packet Take 1 Packet by mouth daily for 7 days. Qty: 7 Packet, Refills: 0  Start date: 8/13/2021, End date: 8/20/2021      senna-docusate (PERICOLACE) 8.6-50 mg per tablet Take 1 Tablet by mouth two (2) times a day for 7 days. Qty: 14 Tablet, Refills: 0  Start date: 8/12/2021, End date: 8/19/2021      ondansetron (ZOFRAN ODT) 4 mg disintegrating tablet Take 1 Tablet by mouth every eight (8) hours as needed for Nausea for up to 5 days. Qty: 15 Tablet, Refills: 0  Start date: 8/12/2021, End date: 8/17/2021         CONTINUE these medications which have NOT CHANGED    Details   predniSONE (DELTASONE) 5 mg tablet Take 10 mg by mouth as needed. Pain left knee      metoprolol succinate (TOPROL-XL) 25 mg XL tablet Take 1 Tablet by mouth daily. Qty: 90 Tablet, Refills: 1      folic acid (FOLVITE) 1 mg tablet Take 1 mg by mouth daily. methotrexate (RHEUMATREX) 2.5 mg tablet Take 2.5 mg by mouth every Wednesday. Wednesdays      melatonin 5 mg cap capsule Take 5 mg by mouth nightly as needed (for sleep aid).       etanercept (EnbreL SureClick) 50 mg/mL (1 mL) injection 50 mg every Sunday.  Sundays         STOP taking these medications       ibuprofen (MOTRIN) 800 mg tablet Comments:   Reason for Stopping:         cyclobenzaprine (FLEXERIL) 10 mg tablet Comments:   Reason for Stopping:            per medical continuation form      -Follow up in office in 2 weeks      Signed:  Meagan Luque NP  Orthopaedic Nurse Practitioner    8/12/2021  12:40 PM

## 2021-08-12 NOTE — PROGRESS NOTES
Bedside and Verbal shift change report given to Ronald LEE (oncoming nurse) by Ramos Saunders (offgoing nurse). Report included the following information SBAR, Kardex, Intake/Output, MAR, Recent Results and Med Rec Status.

## 2021-08-13 ENCOUNTER — HOME CARE VISIT (OUTPATIENT)
Dept: SCHEDULING | Facility: HOME HEALTH | Age: 56
End: 2021-08-13
Payer: COMMERCIAL

## 2021-08-13 VITALS
RESPIRATION RATE: 18 BRPM | OXYGEN SATURATION: 98 % | SYSTOLIC BLOOD PRESSURE: 130 MMHG | DIASTOLIC BLOOD PRESSURE: 70 MMHG | TEMPERATURE: 99.1 F | HEART RATE: 96 BPM

## 2021-08-13 PROCEDURE — G0151 HHCP-SERV OF PT,EA 15 MIN: HCPCS

## 2021-08-13 PROCEDURE — 400013 HH SOC

## 2021-08-17 ENCOUNTER — TELEPHONE (OUTPATIENT)
Dept: CARDIOLOGY CLINIC | Age: 56
End: 2021-08-17

## 2021-08-17 ENCOUNTER — HOME CARE VISIT (OUTPATIENT)
Dept: SCHEDULING | Facility: HOME HEALTH | Age: 56
End: 2021-08-17
Payer: COMMERCIAL

## 2021-08-17 PROCEDURE — G0151 HHCP-SERV OF PT,EA 15 MIN: HCPCS

## 2021-08-17 NOTE — TELEPHONE ENCOUNTER
Called [facility] and advised NP is starting pt back on Metoprolol at 1/2 tab daily for lower BP and then can go up to whole tab is needed. Advised I am calling pt next. She verbalized understanding and thanked me for calling. Called pt,verified pt with two pt identifiers, advised pt I had sent message to NP for advice on HR. Our NP wants her to start back on Metoprolol 25 mg tab at 1/2 tab daily since her BP was trending low. Advised if her BP/HR are okay on 1/2 tab daily she can go up to the full tab daily and continue to monitor her BP/HR. Pt verbalized understanding.

## 2021-08-17 NOTE — TELEPHONE ENCOUNTER
melanie from home health called in, she is at pt's home for PT after her knee surgery on 8/11/21. Pt HR is ranging from 127-136 when moving and at rest. When at rest pt is okay, no symptoms other than HR elevated. When moving pt is soboe, heart palps and very fatigued. They stopped pt's Metoprolol in hospital after her surgery due to dizziness and low BP readings. BP was 80/40 in hospital. Her BP today 112/68. She last had Metoprolol on 8/10/21. They advised pt to hold the Metoprolol until her BP returned to normal and she was no longer dizzy. Pt 's BP good and she has no further dizziness. She has not started the Metoprolol back. Advised Melanie she needs to start the Metoprolol back but I needs to send message to verify symptoms first. Advised if pt felt like she needed ER and she does not. Advised I would send message to NP and call pt and Melanie back later. She verbalized understanding.

## 2021-08-18 VITALS
TEMPERATURE: 98.4 F | DIASTOLIC BLOOD PRESSURE: 68 MMHG | SYSTOLIC BLOOD PRESSURE: 112 MMHG | HEART RATE: 131 BPM | OXYGEN SATURATION: 98 % | RESPIRATION RATE: 18 BRPM

## 2021-08-19 ENCOUNTER — HOME CARE VISIT (OUTPATIENT)
Dept: SCHEDULING | Facility: HOME HEALTH | Age: 56
End: 2021-08-19
Payer: COMMERCIAL

## 2021-08-19 PROCEDURE — G0151 HHCP-SERV OF PT,EA 15 MIN: HCPCS

## 2021-08-20 VITALS
HEART RATE: 94 BPM | RESPIRATION RATE: 18 BRPM | DIASTOLIC BLOOD PRESSURE: 72 MMHG | OXYGEN SATURATION: 99 % | TEMPERATURE: 98.2 F | SYSTOLIC BLOOD PRESSURE: 120 MMHG

## 2021-08-23 ENCOUNTER — HOME CARE VISIT (OUTPATIENT)
Dept: SCHEDULING | Facility: HOME HEALTH | Age: 56
End: 2021-08-23
Payer: COMMERCIAL

## 2021-08-23 VITALS
OXYGEN SATURATION: 98 % | SYSTOLIC BLOOD PRESSURE: 112 MMHG | TEMPERATURE: 98.4 F | DIASTOLIC BLOOD PRESSURE: 70 MMHG | HEART RATE: 100 BPM | RESPIRATION RATE: 18 BRPM

## 2021-08-23 PROCEDURE — G0151 HHCP-SERV OF PT,EA 15 MIN: HCPCS

## 2021-11-12 ENCOUNTER — OFFICE VISIT (OUTPATIENT)
Dept: CARDIOLOGY CLINIC | Age: 56
End: 2021-11-12
Payer: COMMERCIAL

## 2021-11-12 VITALS
WEIGHT: 205.1 LBS | RESPIRATION RATE: 18 BRPM | DIASTOLIC BLOOD PRESSURE: 80 MMHG | BODY MASS INDEX: 32.96 KG/M2 | SYSTOLIC BLOOD PRESSURE: 118 MMHG | HEIGHT: 66 IN | OXYGEN SATURATION: 99 % | HEART RATE: 103 BPM

## 2021-11-12 DIAGNOSIS — I48.0 PAF (PAROXYSMAL ATRIAL FIBRILLATION) (HCC): Primary | ICD-10-CM

## 2021-11-12 DIAGNOSIS — R00.0 SINUS TACHYCARDIA: ICD-10-CM

## 2021-11-12 PROCEDURE — 93000 ELECTROCARDIOGRAM COMPLETE: CPT | Performed by: NURSE PRACTITIONER

## 2021-11-12 PROCEDURE — 99214 OFFICE O/P EST MOD 30 MIN: CPT | Performed by: NURSE PRACTITIONER

## 2021-11-12 RX ORDER — METOPROLOL SUCCINATE 50 MG/1
TABLET, EXTENDED RELEASE ORAL
Qty: 90 TABLET | Refills: 3 | Status: SHIPPED | OUTPATIENT
Start: 2021-11-12

## 2021-11-12 RX ORDER — METOPROLOL SUCCINATE 25 MG/1
TABLET, EXTENDED RELEASE ORAL
Qty: 90 TABLET | Refills: 3 | Status: SHIPPED | OUTPATIENT
Start: 2021-11-12

## 2021-11-12 NOTE — PROGRESS NOTES
Carol Jasso, FN-BC    Subjective/HPI:     Gillian Trivedi is a 60 y.o. female is here for routine f/u. She has a PMHx of PAF, GERD and RA. Here with complaints of increased palpitation symptoms. Recently had knee replacement in 8/2021. Post-operatively developed hypotension, so BB dose was stopped. She was told she could resume it if BP normalized after discharge. Had been working with PT at home and noted to have high HR in the 120s-130s. BP was low normal, so recommended to resume half of her previous dose of metoprolol, 12.5 mg daily. Since then, has gradually increased BB dose, now at 50 mg daily for the past month. Still noticing palpitation symptoms, elevated HR. Denies shortness of breath symptoms.   Has completed PT and back to regular activity, no limitations    Past Medical History:   Diagnosis Date    Arrhythmia 2021    svt, afib    Atrial fibrillation (HCC)     Cancer (HCC)     basal skin cancer, face    GERD (gastroesophageal reflux disease)     Hypotension     Nausea & vomiting     ostearthritis     R[address] ([address]heumatoid arthritis) [address] [address]        Past Surgical History:   Procedure Laterality Date    HX APPENDECTOMY      HX CHOLECYSTECTOMY      1 year ago    HX HYSTERECTOMY      HX ORTHOPAEDIC      bi-lateral pins to knees    HX TONSILLECTOMY      HX WISDOM TEETH EXTRACTION      ME TOTAL KNEE ARTHROPLASTY  5/9/2011    Right       Family History   Problem Relation Age of Onset    Other Mother         tachycardia    Stroke Father     Heart Failure Father 80    Microhematuria Maternal Grandfather 64       Social History     Socioeconomic History    Marital status: SINGLE     Spouse name: Not on file    Number of children: Not on file    Years of education: Not on file    Highest education level: Not on file   Occupational History    Not on file   Tobacco Use    Smoking status: Never Smoker    Smokeless tobacco: Never Used   Vaping Use    Vaping Use: Never used   Substance and Sexual Activity    Alcohol use: Yes     Alcohol/week: 1.0 standard drink     Types: 1 Glasses of wine per week     Comment: occastional    Drug use: Never    Sexual activity: Not Currently     Birth control/protection: Surgical   Other Topics Concern    Not on file   Social History Narrative    Not on file     Social Determinants of Health     Financial Resource Strain:     Difficulty of Paying Living Expenses: Not on file   Food Insecurity:     Worried About Running Out of Food in the Last Year: Not on file    Allegra of Food in the Last Year: Not on file   Transportation Needs:     Lack of Transportation (Medical): Not on file    Lack of Transportation (Non-Medical): Not on file   Physical Activity:     Days of Exercise per Week: Not on file    Minutes of Exercise per Session: Not on file   Stress:     Feeling of Stress : Not on file   Social Connections:     Frequency of Communication with Friends and Family: Not on file    Frequency of Social Gatherings with Friends and Family: Not on file    Attends [employer] Services: Not on file    Active Member of [address] [place] [address] or Organizations: Not on file    Attends Club or Organization Meetings: Not on file    Marital Status: Not on file   Intimate Partner Violence:     Fear of Current or Ex-Partner: Not on file    Emotionally Abused: Not on file    Physically Abused: Not on file    Sexually Abused: Not on file   Housing Stability:     Unable to Pay for Housing in the Last Year: Not on file    Number of [place] in the Last Year: Not on file    Unstable Housing in the Last Year: Not on file       Allergies   Allergen Reactions    Morphine Shortness of Breath       Current Outpatient Medications on File Prior to Visit   Medication Sig Dispense Refill    acetaminophen (TYLENOL) 500 mg tablet Take 1,000 mg by mouth every six (6) hours as needed for Pain.  folic acid (FOLVITE) 1 mg tablet Take 1 mg by mouth daily.  methotrexate (RHEUMATREX) 2.5 mg tablet Take 2.5 mg by mouth every Wednesday. Wednesdays      melatonin 5 mg cap capsule Take 5 mg by mouth nightly as needed (for sleep aid).  etanercept (EnbreL SureClick) 50 mg/mL (1 mL) injection 50 mg every Sunday. Sundays      [DISCONTINUED] oxyCODONE IR (ROXICODONE) 5 mg immediate release tablet Take 5 mg by mouth every four (4) hours as needed for Pain. (Patient not taking: Reported on 11/12/2021)      [DISCONTINUED] predniSONE (DELTASONE) 5 mg tablet Take 10 mg by mouth as needed. Pain left knee (Patient not taking: Reported on 11/12/2021)      [DISCONTINUED] metoprolol succinate (TOPROL-XL) 25 mg XL tablet Take 1 Tablet by mouth daily. (Patient taking differently: Take 50 mg by mouth daily.) 90 Tablet 1     No current facility-administered medications on file prior to visit. Review of Symptoms:    Review of Systems   Constitutional: Negative for chills, fever and weight loss. HENT: Negative for nosebleeds. Eyes: Negative for blurred vision and double vision. Respiratory: Negative for cough, shortness of breath and wheezing. Cardiovascular: Positive for palpitations. Negative for chest pain, orthopnea, leg swelling and PND. Skin: Negative for rash. Neurological: Negative for dizziness and loss of consciousness. Physical Exam:      General: Well developed, in no acute distress, cooperative and alert  Heart:  reg rate and rhythm; normal S1/S2; no murmurs, no gallops or rubs. Respiratory: Clear bilaterally x 4, no wheezing or rales  Extremities:  Normal cap refill, no cyanosis, atraumatic. No edema.   Vascular: 2+ pulses symmetric in all extremities    Vitals:    11/12/21 1046   BP: 118/80   BP 1 Location: Left upper arm   BP Patient Position: Sitting   BP Cuff Size: Large adult   Pulse: (!) 103   Resp: 18   Height: 5' 6\" (1.676 m)   Weight: 205 lb 1.6 oz (93 kg)   SpO2: 99%       No results found for: CHOL, CHOLPOCT, CHOLX, CHLST, CHOLV, HDL, HDLPOC, HDLP, LDL, LDLCPOC, LDLC, DLDLP, VLDLC, VLDL, TGLX, TRIGL, TRIGP, TGLPOCT, CHHD, CHHDX  Lab Results   Component Value Date/Time    WBC 4.9 08/03/2021 08:44 AM    HGB 11.6 08/12/2021 04:01 AM    HCT 40.5 08/03/2021 08:44 AM    PLATELET 703 68/68/9445 08:44 AM    MCV 95.7 08/03/2021 08:44 AM     Lab Results   Component Value Date/Time    Sodium 136 08/12/2021 04:01 AM    Potassium 4.1 08/12/2021 04:01 AM    Chloride 108 08/12/2021 04:01 AM    CO2 19 (L) 08/12/2021 04:01 AM    Anion gap 9 08/12/2021 04:01 AM    Glucose 179 (H) 08/12/2021 04:01 AM    BUN 14 08/12/2021 04:01 AM    Creatinine 0.85 08/12/2021 04:01 AM    BUN/Creatinine ratio 16 08/12/2021 04:01 AM    GFR est AA >60 08/12/2021 04:01 AM    GFR est non-AA >60 08/12/2021 04:01 AM    Calcium 8.0 (L) 08/12/2021 04:01 AM    Bilirubin, total 0.8 08/03/2021 08:44 AM    Alk. phosphatase 81 08/03/2021 08:44 AM    Protein, total 7.1 08/03/2021 08:44 AM    Albumin 3.0 (L) 08/03/2021 08:44 AM    Globulin 4.1 (H) 08/03/2021 08:44 AM    A-G Ratio 0.7 (L) 08/03/2021 08:44 AM    ALT (SGPT) 44 08/03/2021 08:44 AM    AST (SGOT) 23 08/03/2021 08:44 AM       ECG done today sinus tachycardia     Assessment:       ICD-10-CM ICD-9-CM    1. PAF (paroxysmal atrial fibrillation) (HCC)  I48.0 427.31 AMB POC EKG ROUTINE W/ 12 LEADS, INTER & REP      CBC W/O DIFF      TSH 3RD GENERATION      T3 UPTAKE PROFILE      T4 (THYROXINE)      METABOLIC PANEL, COMPREHENSIVE   2. Sinus tachycardia  R00.0 427.89 CBC W/O DIFF      TSH 3RD GENERATION      T3 UPTAKE PROFILE      T4 (THYROXINE)      METABOLIC PANEL, COMPREHENSIVE        Plan:     1.  PAF (paroxysmal atrial fibrillation) (HCC)  Increased palpitation symptoms  2-week event monitor done 7/2021 with no AF, but 1 episode of SVT  Echo done 8/2021 with preserved LVEF 55-60%, mild MR  BB previously d/c'd due to hypotension post surgery, but did restart at half dose  Sinus tachycardia today, without AF  Increase Toprol 50 mg in AM and 25 mg in PM for 2 weeks; if HR remain > 100 bpm, and BP > 624 systolic, then can increase Toprol to 50 mg BID  Check labs -- CBC, thyroid panel and CMP  Hold ASA due to low [id number] and no recent evidence of AF    F/u in 1 month to reassess     On this date 11/12/2021 I have spent 35 minutes reviewing previous notes, test results and face to face with the patient discussing the diagnosis and importance of compliance with the treatment plan as well as documenting on the day of the visit.

## 2021-11-12 NOTE — PROGRESS NOTES
1. Have you been to the ER, urgent care clinic since your last visit? Hospitalized since your last visit? Yes, 8/11/21, [facility],  Left total knee replacement     2. Have you seen or consulted any other health care providers outside of the [address] [place] [address] since your last visit? Include any pap smears or colon screening.  No         Chief Complaint   Patient presents with    Irregular Heart Beat     C/O  Palpitations

## 2021-11-13 LAB
ALBUMIN SERPL-MCNC: 4 G/DL (ref 3.8–4.9)
ALBUMIN/GLOB SERPL: 1.4 {RATIO} (ref 1.2–2.2)
ALP SERPL-CCNC: 104 IU/L (ref 44–121)
ALT SERPL-CCNC: 60 IU/L (ref 0–32)
AST SERPL-CCNC: 46 IU/L (ref 0–40)
BILIRUB SERPL-MCNC: 0.2 MG/DL (ref 0–1.2)
BUN SERPL-MCNC: 13 MG/DL (ref 6–24)
BUN/CREAT SERPL: 20 (ref 9–23)
CALCIUM SERPL-MCNC: 9.5 MG/DL (ref 8.7–10.2)
CHLORIDE SERPL-SCNC: 104 MMOL/L (ref 96–106)
CO2 SERPL-SCNC: 26 MMOL/L (ref 20–29)
CREAT SERPL-MCNC: 0.66 MG/DL (ref 0.57–1)
ERYTHROCYTE [DISTWIDTH] IN BLOOD BY AUTOMATED COUNT: 13.9 % (ref 11.7–15.4)
FT4I SERPL CALC-MCNC: 1.9 (ref 1.2–4.9)
GLOBULIN SER CALC-MCNC: 2.8 G/DL (ref 1.5–4.5)
GLUCOSE SERPL-MCNC: 108 MG/DL (ref 65–99)
HCT VFR BLD AUTO: 43.2 % (ref 34–46.6)
HGB BLD-MCNC: 14.4 G/DL (ref 11.1–15.9)
MCH RBC QN AUTO: 30.1 PG (ref 26.6–33)
MCHC RBC AUTO-ENTMCNC: 33.3 G/DL (ref 31.5–35.7)
MCV RBC AUTO: 90 FL (ref 79–97)
PLATELET # BLD AUTO: 343 X10E3/UL (ref 150–450)
POTASSIUM SERPL-SCNC: 5 MMOL/L (ref 3.5–5.2)
PROT SERPL-MCNC: 6.8 G/DL (ref 6–8.5)
RBC # BLD AUTO: 4.78 X10E6/UL (ref 3.77–5.28)
SODIUM SERPL-SCNC: 142 MMOL/L (ref 134–144)
T3RU NFR SERPL: 25 % (ref 24–39)
T4 SERPL-MCNC: 7.7 UG/DL (ref 4.5–12)
TSH SERPL DL<=0.005 MIU/L-ACNC: 2.1 UIU/ML (ref 0.45–4.5)
WBC # BLD AUTO: 5.1 X10E3/UL (ref 3.4–10.8)

## 2021-11-15 NOTE — PROGRESS NOTES
Samara,    Please call the patient and inform that her labs are all normal.  Electrolytes within normal range, normal kidney/liver function. No concern for infection or anemia. Thyroid is functioning appropriately. Keep fu with me as scheduled in 1 month.     Thanks,  Yenny Nugent

## 2021-11-16 ENCOUNTER — TELEPHONE (OUTPATIENT)
Dept: CARDIOLOGY CLINIC | Age: 56
End: 2021-11-16

## 2021-11-16 NOTE — TELEPHONE ENCOUNTER
----- Message from Marlon Reyes NP sent at 11/15/2021 11:38 AM EST -----  Samara,    Please call the patient and inform that her labs are all normal.  Electrolytes within normal range, normal kidney/liver function. No concern for infection or anemia. Thyroid is functioning appropriately. Keep fu with me as scheduled in 1 month.     Thanks,  [employer]

## 2022-01-18 ENCOUNTER — TELEPHONE (OUTPATIENT)
Dept: CARDIOLOGY CLINIC | Age: 57
End: 2022-01-18

## 2022-02-10 NOTE — TELEPHONE ENCOUNTER
Spoke with [facility] order for Metoprolol succinate 50 mg #90 X 3 & Metoprolol succinate 25 mg #90 x 3 for total of 75 mg daily given . Patient informed she states the previous Metoprolol was tartrate.  Has appt 3/3/22 with ALMA DELIA Robles

## 2022-02-10 NOTE — TELEPHONE ENCOUNTER
Patient is out of [facility] and [facility] said pharmacy said they would not refill it because they are questioning the dosage.  Please call her at [address]

## 2022-02-11 ENCOUNTER — TELEPHONE (OUTPATIENT)
Dept: CARDIOLOGY CLINIC | Age: 57
End: 2022-02-11

## 2022-02-11 NOTE — TELEPHONE ENCOUNTER
Sent to insurance  plan key # [id number] request for coverage of Toprol XL 25 mg & 50 mg for total of 75 mg daily

## 2022-02-28 NOTE — PROGRESS NOTES
[address] [place] [address], [address] [facility]  [phone]     Subjective:      Wesly Sherman is a 60 y.o. female is here for routine f/u. She has a PMHx of PAF, GERD and RA. Last seen by us in 11/2021  Here with complaints of increased palpitation symptoms. Recently had knee replacement in 8/2021. Post-operatively developed hypotension, so BB dose was stopped. She was told she could resume it if BP normalized after discharge. Had been working with PT at home and noted to have high HR in the 120s-130s. BP was low normal, so recommended to resume half of her previous dose of metoprolol, 12.5 mg daily.     Since then, has gradually increased BB dose, now at 50 mg daily for the past month. Still noticing palpitation symptoms, elevated HR. Denies shortness of breath symptoms. Has completed PT and back to regular activity, no limitations      Today, doing well from CV standpoint. No recurrent palpitation    The patient denies chest pain/ shortness of breath, orthopnea, PND, LE edema, palpitations, syncope, or presyncope.        Patient Active Problem List    Diagnosis Date Noted    Osteoarthritis of left knee 08/11/2021    DJD (degenerative joint disease) of knee 05/10/2011      Sunni Sarah NP  Past Medical History:   Diagnosis Date    Arrhythmia 2021    svt, afib    Atrial fibrillation ([address]     Cancer (HCC)     basal skin cancer, face    GERD (gastroesophageal reflux disease)     Hypotension     Nausea & vomiting     ostearthritis     RHA (rheumatoid arthritis) ([address]       Past Surgical History:   Procedure Laterality Date    HX APPENDECTOMY      HX CHOLECYSTECTOMY      1 year ago    HX HYSTERECTOMY      HX ORTHOPAEDIC      bi-lateral pins to knees    HX TONSILLECTOMY      HX WISDOM TEETH EXTRACTION      CO TOTAL KNEE ARTHROPLASTY  5/9/2011    Right     Allergies   Allergen Reactions    Morphine Shortness of Breath      Family History   Problem Relation Age of Onset    Other Mother         tachycardia    Stroke Father     Heart Failure Father 80    Microhematuria Maternal Grandfather 64      Social History     Socioeconomic History    Marital status: SINGLE     Spouse name: Not on file    Number of children: Not on file    Years of education: Not on file    Highest education level: Not on file   Occupational History    Not on file   Tobacco Use    Smoking status: Never Smoker    Smokeless tobacco: Never Used   Vaping Use    Vaping Use: Never used   Substance and Sexual Activity    Alcohol use: Yes     Alcohol/week: 1.0 standard drink     Types: 1 Glasses of wine per week     Comment: occastional    Drug use: Never    Sexual activity: Not Currently     Birth control/protection: Surgical   Other Topics Concern    Not on file   Social History Narrative    Not on file     Social Determinants of Health     Financial Resource Strain:     Difficulty of Paying Living Expenses: Not on file   Food Insecurity:     Worried About Running Out of Food in the Last Year: Not on file    Allegra of Food in the Last Year: Not on file   Transportation Needs:     Lack of Transportation (Medical): Not on file    Lack of Transportation (Non-Medical):  Not on file   Physical Activity:     Days of Exercise per Week: Not on file    Minutes of Exercise per Session: Not on file   Stress:     Feeling of Stress : Not on file   Social Connections:     Frequency of Communication with Friends and Family: Not on file    Frequency of Social Gatherings with Friends and Family: Not on file    Attends [employer] Services: Not on file    Active Member of Clubs or Organizations: Not on file    Attends Club or Organization Meetings: Not on file    Marital Status: Not on file   Intimate Partner Violence:     Fear of Current or Ex-Partner: Not on file    Emotionally Abused: Not on file    Physically Abused: Not on file    Sexually Abused: Not on file   Housing Stability:     Unable to Pay for Housing in the Last Year: Not on file    Number of Places Lived in the Last Year: Not on file    Unstable Housing in the Last Year: Not on file      Current Outpatient Medications   Medication Sig    metoprolol succinate (TOPROL-XL) 25 mg XL tablet Take 1 tablet every bedtime for a total of 75 mg daily    metoprolol succinate (TOPROL-XL) 50 mg XL tablet Take 1 tablet daily for a total of 75 mg daily    acetaminophen (TYLENOL) 500 mg tablet Take 1,000 mg by mouth every six (6) hours as needed for Pain.  folic acid (FOLVITE) 1 mg tablet Take 1 mg by mouth daily.  methotrexate (RHEUMATREX) 2.5 mg tablet Take 2.5 mg by mouth every Wednesday. Wednesdays    melatonin 5 mg cap capsule Take 5 mg by mouth nightly as needed (for sleep aid).  etanercept (EnbreL SureClick) 50 mg/mL (1 mL) injection 50 mg every Sunday. Sundays     No current facility-administered medications for this visit. Review of Symptoms:  11 systems reviewed, negative other than as stated in the HPI    Physical ExamPhysical Exam:    There were no vitals filed for this visit. There is no height or weight on file to calculate BMI. General PE  Gen:  NAD  Mental Status - Alert. General Appearance - Not in acute distress. HEENT:  PERRL, no carotid bruits or JVD  Chest and Lung Exam   Inspection: Accessory muscles - No use of accessory muscles in breathing. Auscultation:   Breath sounds: - Normal.   Cardiovascular   Inspection: Jugular vein - Bilateral - Inspection Normal.   Palpation/Percussion:   Apical Impulse: - Normal.   Auscultation: Rhythm - Regular. Heart Sounds - S1 WNL and S2 WNL. No S3 or S4. Murmurs & Other Heart Sounds: Auscultation of the heart reveals - No Murmurs. Peripheral Vascular   Upper Extremity: Inspection - Bilateral - No Cyanotic nailbeds or Digital clubbing. Lower Extremity:   Palpation: Edema - Bilateral - No edema. Abdomen:   Soft, non-tender, bowel sounds are active.   Neuro: A&O times 3, CN and motor grossly WNL    Labs:   No results found for: CHOL, CHOLX, CHLST, CHOLV, 121348, HDL, HDLP, LDL, LDLC, DLDLP, TGLX, TRIGL, TRIGP, CHHD, CHHDX  No results found for: CPK, CPKX, CPX  Lab Results   Component Value Date/Time    Sodium 142 11/12/2021 11:46 AM    Potassium 5.0 11/12/2021 11:46 AM    Chloride 104 11/12/2021 11:46 AM    CO2 26 11/12/2021 11:46 AM    Anion gap 9 08/12/2021 04:01 AM    Glucose 108 (H) 11/12/2021 11:46 AM    BUN 13 11/12/2021 11:46 AM    Creatinine 0.66 11/12/2021 11:46 AM    BUN/Creatinine ratio 20 11/12/2021 11:46 AM    GFR est  11/12/2021 11:46 AM    GFR est non-AA 98 11/12/2021 11:46 AM    Calcium 9.5 11/12/2021 11:46 AM    Bilirubin, total 0.2 11/12/2021 11:46 AM    Alk. phosphatase 104 11/12/2021 11:46 AM    Protein, total 6.8 11/12/2021 11:46 AM    Albumin 4.0 11/12/2021 11:46 AM    Globulin 4.1 (H) 08/03/2021 08:44 AM    A-G Ratio 1.4 11/12/2021 11:46 AM    ALT (SGPT) 60 (H) 11/12/2021 11:46 AM       EKG:  NSR      Assessment:     Assessment:        ICD-10-CM ICD-9-CM    1. PAF (paroxysmal atrial fibrillation) (HCC)  I48.0 427.31    2. Sinus tachycardia  R00.0 427.89    3. Palpitations  R00.2 785.1    4. Chest pain, unspecified type  R07.9 786.50    5. Fluttering heart  I49.8 427.42    6. Mitral valve insufficiency, unspecified etiology  I34.0 424.0        No orders of the defined types were placed in this encounter.        Plan:     PAF (paroxysmal atrial fibrillation) (HCC)  Increased palpitation symptoms last OV with normal  CBC, thyroid panel and CMP in 11/2021  2-week event monitor done 7/2021 with no AF, but 1 episode of SVT  Echo done 8/2021 with preserved LVEF 55-60%, mild MR  Continue Toprol 50 mg in AM and 25 mg in PM; if HR remain > 100 bpm, and BP > 293 systolic, then can increase Toprol to 50 mg BID  Hold ASA due to low [id number] and no recent evidence of AF    Hx atypical cp last episode was in 11/19, occurred while sitting down and lasted about 10 minutes and went away  Normal stress test 9/2020. Denies further CP. No fam hx of CAD/MI.    Defers CAC score     RA  On biologic therapy, followed by Dr Maciej Driscoll current care and f/u in 1 yr      Darylene Sark, NP

## 2022-03-03 ENCOUNTER — OFFICE VISIT (OUTPATIENT)
Dept: CARDIOLOGY CLINIC | Age: 57
End: 2022-03-03
Payer: COMMERCIAL

## 2022-03-03 VITALS
OXYGEN SATURATION: 99 % | BODY MASS INDEX: 34.62 KG/M2 | DIASTOLIC BLOOD PRESSURE: 70 MMHG | HEART RATE: 60 BPM | SYSTOLIC BLOOD PRESSURE: 104 MMHG | RESPIRATION RATE: 18 BRPM | HEIGHT: 66 IN | WEIGHT: 215.4 LBS

## 2022-03-03 DIAGNOSIS — I34.0 MITRAL VALVE INSUFFICIENCY, UNSPECIFIED ETIOLOGY: ICD-10-CM

## 2022-03-03 DIAGNOSIS — R07.9 CHEST PAIN, UNSPECIFIED TYPE: ICD-10-CM

## 2022-03-03 DIAGNOSIS — R00.2 PALPITATIONS: ICD-10-CM

## 2022-03-03 DIAGNOSIS — I48.0 PAF (PAROXYSMAL ATRIAL FIBRILLATION) (HCC): Primary | ICD-10-CM

## 2022-03-03 DIAGNOSIS — I49.8 FLUTTERING HEART: ICD-10-CM

## 2022-03-03 DIAGNOSIS — R00.0 SINUS TACHYCARDIA: ICD-10-CM

## 2022-03-03 PROCEDURE — 93000 ELECTROCARDIOGRAM COMPLETE: CPT | Performed by: NURSE PRACTITIONER

## 2022-03-03 PROCEDURE — 99214 OFFICE O/P EST MOD 30 MIN: CPT | Performed by: NURSE PRACTITIONER

## 2022-03-03 NOTE — PROGRESS NOTES
1. Have you been to the ER, urgent care clinic since your last visit? Hospitalized since your last visit? No    2. Have you seen or consulted any other health care providers outside of the [address] [place] [address] since your last visit? Include any pap smears or colon screening.  No         Chief Complaint   Patient presents with    Irregular Heart Beat     Pt denies cardiac symptoms

## 2022-03-19 PROBLEM — M17.12 OSTEOARTHRITIS OF LEFT KNEE: Status: ACTIVE | Noted: 2021-08-11

## 2022-11-07 RX ORDER — METOPROLOL SUCCINATE 50 MG/1
TABLET, EXTENDED RELEASE ORAL
Qty: 90 TABLET | Refills: 0 | Status: SHIPPED | OUTPATIENT
Start: 2022-11-07

## 2022-11-07 NOTE — TELEPHONE ENCOUNTER
PCP: Farrukh Bran NP    Last appt: 3/2022  No future appointments. Requested Prescriptions     Signed Prescriptions Disp Refills    metoprolol succinate (TOPROL-XL) 50 mg XL tablet 90 Tablet 0     Sig: TAKE ONE TABLET BY MOUTH EVERY DAY - WITH THE 25MG FOR TOTAL OF 75MG. APPT required prior to refills. Authorizing Provider: Milana Garay     Ordering User: Maddy LEE         Other Comments:  Verbal order per provider. Order (medication, dose, route, frequency, amount, refills) repeated and verified twice.

## 2023-01-01 ENCOUNTER — HOSPITAL ENCOUNTER (EMERGENCY)
Age: 58
Discharge: HOME | End: 2023-01-01
Attending: STUDENT IN AN ORGANIZED HEALTH CARE EDUCATION/TRAINING PROGRAM
Payer: COMMERCIAL

## 2023-01-01 ENCOUNTER — APPOINTMENT (OUTPATIENT)
Dept: GENERAL RADIOLOGY | Age: 58
End: 2023-01-01
Attending: STUDENT IN AN ORGANIZED HEALTH CARE EDUCATION/TRAINING PROGRAM
Payer: COMMERCIAL

## 2023-01-01 VITALS
BODY MASS INDEX: 33.75 KG/M2 | OXYGEN SATURATION: 97 % | DIASTOLIC BLOOD PRESSURE: 73 MMHG | HEART RATE: 89 BPM | HEIGHT: 66 IN | WEIGHT: 210 LBS | SYSTOLIC BLOOD PRESSURE: 138 MMHG | TEMPERATURE: 98.2 F | RESPIRATION RATE: 16 BRPM

## 2023-01-01 DIAGNOSIS — U07.1 COVID-19: Primary | ICD-10-CM

## 2023-01-01 LAB
ALBUMIN SERPL-MCNC: 3 G/DL (ref 3.5–5)
ALBUMIN/GLOB SERPL: 0.9 {RATIO} (ref 1.1–2.2)
ALP SERPL-CCNC: 95 U/L (ref 45–117)
ALT SERPL-CCNC: 54 U/L (ref 12–78)
ANION GAP SERPL CALC-SCNC: 8 MMOL/L (ref 5–15)
AST SERPL-CCNC: 38 U/L (ref 15–37)
BASOPHILS # BLD: 0 K/UL (ref 0–0.1)
BASOPHILS NFR BLD: 0 % (ref 0–1)
BILIRUB SERPL-MCNC: 0.5 MG/DL (ref 0.2–1)
BUN SERPL-MCNC: 9 MG/DL (ref 6–20)
BUN/CREAT SERPL: 15 (ref 12–20)
CALCIUM SERPL-MCNC: 8.7 MG/DL (ref 8.5–10.1)
CHLORIDE SERPL-SCNC: 104 MMOL/L (ref 97–108)
CO2 SERPL-SCNC: 25 MMOL/L (ref 21–32)
CREAT SERPL-MCNC: 0.6 MG/DL (ref 0.55–1.02)
DIFFERENTIAL METHOD BLD: ABNORMAL
EOSINOPHIL # BLD: 0 K/UL (ref 0–0.4)
EOSINOPHIL NFR BLD: 0 % (ref 0–7)
ERYTHROCYTE [DISTWIDTH] IN BLOOD BY AUTOMATED COUNT: 13.8 % (ref 11.5–14.5)
GLOBULIN SER CALC-MCNC: 3.5 G/DL (ref 2–4)
GLUCOSE SERPL-MCNC: 111 MG/DL (ref 65–100)
HCT VFR BLD AUTO: 41.4 % (ref 35–47)
HGB BLD-MCNC: 14.3 G/DL (ref 11.5–16)
IMM GRANULOCYTES # BLD AUTO: 0 K/UL (ref 0–0.04)
IMM GRANULOCYTES NFR BLD AUTO: 0 % (ref 0–0.5)
LYMPHOCYTES # BLD: 1.1 K/UL (ref 0.8–3.5)
LYMPHOCYTES NFR BLD: 15 % (ref 12–49)
MCH RBC QN AUTO: 32.2 PG (ref 26–34)
MCHC RBC AUTO-ENTMCNC: 34.5 G/DL (ref 30–36.5)
MCV RBC AUTO: 93.2 FL (ref 80–99)
MONOCYTES # BLD: 1.1 K/UL (ref 0–1)
MONOCYTES NFR BLD: 15 % (ref 5–13)
NEUTS SEG # BLD: 5.2 K/UL (ref 1.8–8)
NEUTS SEG NFR BLD: 70 % (ref 32–75)
NRBC # BLD: 0 K/UL (ref 0–0.01)
NRBC BLD-RTO: 0 PER 100 WBC
PLATELET # BLD AUTO: 284 K/UL (ref 150–400)
PMV BLD AUTO: 9.5 FL (ref 8.9–12.9)
POTASSIUM SERPL-SCNC: 3.6 MMOL/L (ref 3.5–5.1)
PROT SERPL-MCNC: 6.5 G/DL (ref 6.4–8.2)
RBC # BLD AUTO: 4.44 M/UL (ref 3.8–5.2)
SODIUM SERPL-SCNC: 137 MMOL/L (ref 136–145)
WBC # BLD AUTO: 7.5 K/UL (ref 3.6–11)

## 2023-01-01 PROCEDURE — 74011250636 HC RX REV CODE- 250/636: Performed by: STUDENT IN AN ORGANIZED HEALTH CARE EDUCATION/TRAINING PROGRAM

## 2023-01-01 PROCEDURE — 71045 X-RAY EXAM CHEST 1 VIEW: CPT

## 2023-01-01 PROCEDURE — 99284 EMERGENCY DEPT VISIT MOD MDM: CPT

## 2023-01-01 PROCEDURE — 36415 COLL VENOUS BLD VENIPUNCTURE: CPT

## 2023-01-01 PROCEDURE — 85025 COMPLETE CBC W/AUTO DIFF WBC: CPT

## 2023-01-01 PROCEDURE — 96374 THER/PROPH/DIAG INJ IV PUSH: CPT

## 2023-01-01 PROCEDURE — 80053 COMPREHEN METABOLIC PANEL: CPT

## 2023-01-01 PROCEDURE — 96375 TX/PRO/DX INJ NEW DRUG ADDON: CPT

## 2023-01-01 PROCEDURE — 96361 HYDRATE IV INFUSION ADD-ON: CPT

## 2023-01-01 RX ORDER — ONDANSETRON 4 MG/1
4 TABLET, FILM COATED ORAL
Qty: 20 TABLET | Refills: 0 | Status: SHIPPED | OUTPATIENT
Start: 2023-01-01

## 2023-01-01 RX ORDER — KETOROLAC TROMETHAMINE 30 MG/ML
15 INJECTION, SOLUTION INTRAMUSCULAR; INTRAVENOUS
Status: COMPLETED | OUTPATIENT
Start: 2023-01-01 | End: 2023-01-01

## 2023-01-01 RX ORDER — PROCHLORPERAZINE EDISYLATE 5 MG/ML
10 INJECTION INTRAMUSCULAR; INTRAVENOUS
Status: DISCONTINUED | OUTPATIENT
Start: 2023-01-01 | End: 2023-01-02 | Stop reason: HOSPADM

## 2023-01-01 RX ORDER — DIPHENHYDRAMINE HYDROCHLORIDE 50 MG/ML
12.5 INJECTION, SOLUTION INTRAMUSCULAR; INTRAVENOUS
Status: COMPLETED | OUTPATIENT
Start: 2023-01-01 | End: 2023-01-01

## 2023-01-01 RX ORDER — PROCHLORPERAZINE EDISYLATE 5 MG/ML
5 INJECTION INTRAMUSCULAR; INTRAVENOUS
Status: DISCONTINUED | OUTPATIENT
Start: 2023-01-01 | End: 2023-01-01

## 2023-01-01 RX ORDER — ONDANSETRON 2 MG/ML
4 INJECTION INTRAMUSCULAR; INTRAVENOUS ONCE
Status: COMPLETED | OUTPATIENT
Start: 2023-01-01 | End: 2023-01-01

## 2023-01-01 RX ADMIN — DIPHENHYDRAMINE HYDROCHLORIDE 12.5 MG: 50 INJECTION, SOLUTION INTRAMUSCULAR; INTRAVENOUS at 21:02

## 2023-01-01 RX ADMIN — PROCHLORPERAZINE EDISYLATE 10 MG: 5 INJECTION INTRAMUSCULAR; INTRAVENOUS at 21:02

## 2023-01-01 RX ADMIN — SODIUM CHLORIDE 1000 ML: 9 INJECTION, SOLUTION INTRAVENOUS at 17:18

## 2023-01-01 RX ADMIN — KETOROLAC TROMETHAMINE 15 MG: 30 INJECTION, SOLUTION INTRAMUSCULAR at 18:35

## 2023-01-01 RX ADMIN — ONDANSETRON 4 MG: 2 INJECTION INTRAMUSCULAR; INTRAVENOUS at 17:14

## 2023-01-01 NOTE — ED PROVIDER NOTES
EMERGENCY DEPARTMENT HISTORY AND PHYSICAL EXAM      Date: 1/1/2023  Patient Name: Marita Mak    History of Presenting Illness     Chief Complaint   Patient presents with    Positive For Covid-19     Ambulatory into triage, cc of testing positive for covid, sx began 12/30. she is on methotrexate. She states that her symptoms have been progressively worsening. She has been vomiting since last night    Vomiting       History Provided By: Patient    HPI: Marita Mak, 59 y.o. female presents to the ED with cc of nausea and vomiting after testing positive for COVID yesterday. Was tested at [facility]. States she does not believe that she tested positive for the flu as well. States that she initially had upper respiratory symptoms but then started experiencing nausea and vomiting. She denies any abdominal pain. She reports slight headache. She has a history of rheumatoid arthritis on methotrexate and Enbrel. She denies any chest pain or shortness of breath. There are no other complaints, changes, or physical findings at this time. PCP: Kim Johnson NP    No current facility-administered medications on file prior to encounter. Current Outpatient Medications on File Prior to Encounter   Medication Sig Dispense Refill    metoprolol succinate (TOPROL-XL) 50 mg XL tablet TAKE ONE TABLET BY MOUTH EVERY DAY - WITH THE 25MG FOR TOTAL OF 75MG. APPT required prior to refills. 90 Tablet 0    metoprolol succinate (TOPROL-XL) 25 mg XL tablet Take 1 tablet every bedtime for a total of 75 mg daily (Patient taking differently: 1 tablet in the morning 2 tabs in the evening) 90 Tablet 3    acetaminophen (TYLENOL) 500 mg tablet Take 1,000 mg by mouth every six (6) hours as needed for Pain. folic acid (FOLVITE) 1 mg tablet Take 1 mg by mouth daily. methotrexate (RHEUMATREX) 2.5 mg tablet Take 2.5 mg by mouth every Wednesday.  Wednesdays      melatonin 5 mg cap capsule Take 5 mg by mouth nightly as needed (for sleep aid). etanercept (EnbreL SureClick) 50 mg/mL (1 mL) injection 50 mg every Sunday. Sundays         Past History     Past Medical History:  Past Medical History:   Diagnosis Date    Arrhythmia 2021    svt, afib    Atrial fibrillation (HCC)     Cancer (HCC)     basal skin cancer, face    GERD (gastroesophageal reflux disease)     Hypotension     Nausea & vomiting     ostearthritis     RHA (rheumatoid arthritis) ([facility]        Past Surgical History:  Past Surgical History:   Procedure Laterality Date    HX APPENDECTOMY      HX CHOLECYSTECTOMY      1 year ago    HX HYSTERECTOMY      HX ORTHOPAEDIC      bi-lateral pins to knees    HX TONSILLECTOMY      HX WISDOM TEETH EXTRACTION      DE TOTAL KNEE ARTHROPLASTY  5/9/2011    Right       Family History:  Family History   Problem Relation Age of Onset    Other Mother         tachycardia    Stroke Father     Heart Failure Father 80    Microhematuria Maternal Grandfather 64       Social History:  Social History     Tobacco Use    Smoking status: Never    Smokeless tobacco: Never   Vaping Use    Vaping Use: Never used   Substance Use Topics    Alcohol use: Yes     Alcohol/week: 1.0 standard drink     Types: 1 Glasses of wine per week     Comment: occastional    Drug use: Never       Allergies: Allergies   Allergen Reactions    Morphine Shortness of Breath         Review of Systems   Review of Systems   Gastrointestinal:  Positive for nausea and vomiting. Neurological:  Positive for headaches. Physical Exam   Physical Exam  Vitals and nursing note reviewed. Constitutional:       Appearance: She is well-developed. HENT:      Head: Normocephalic and atraumatic. Cardiovascular:      Rate and Rhythm: Normal rate and regular rhythm. Pulmonary:      Effort: Pulmonary effort is normal.      Breath sounds: Normal breath sounds. Abdominal:      General: There is no distension. Palpations: Abdomen is soft.    Musculoskeletal:         General: Normal range of motion. Cervical back: Neck supple. Skin:     General: Skin is warm and dry. Neurological:      Mental Status: She is alert. Comments: Alert and oriented x 3, CN II-XII are intact. No facial droop, pronator drift or any upper or lower extremity weakness. Normal speech pattern, n Intact gait. Psychiatric:         Mood and Affect: Mood normal.         Behavior: Behavior normal.       Diagnostic Study Results     Labs -     Recent Results (from the past 12 hour(s))   CBC WITH AUTOMATED DIFF    Collection Time: 01/01/23  4:56 PM   Result Value Ref Range    WBC 7.5 3.6 - 11.0 K/uL    RBC 4.44 3.80 - 5.20 M/uL    HGB 14.3 11.5 - 16.0 g/dL    HCT 41.4 35.0 - 47.0 %    MCV 93.2 80.0 - 99.0 FL    MCH 32.2 26.0 - 34.0 PG    MCHC 34.5 30.0 - 36.5 g/dL    RDW 13.8 11.5 - 14.5 %    PLATELET 890 619 - 953 K/uL    MPV 9.5 8.9 - 12.9 FL    NRBC 0.0 0  WBC    ABSOLUTE NRBC 0.00 0.00 - 0.01 K/uL    NEUTROPHILS 70 32 - 75 %    LYMPHOCYTES 15 12 - 49 %    MONOCYTES 15 (H) 5 - 13 %    EOSINOPHILS 0 0 - 7 %    BASOPHILS 0 0 - 1 %    IMMATURE GRANULOCYTES 0 0.0 - 0.5 %    ABS. NEUTROPHILS 5.2 1.8 - 8.0 K/UL    ABS. LYMPHOCYTES 1.1 0.8 - 3.5 K/UL    ABS. MONOCYTES 1.1 (H) 0.0 - 1.0 K/UL    ABS. EOSINOPHILS 0.0 0.0 - 0.4 K/UL    ABS. BASOPHILS 0.0 0.0 - 0.1 K/UL    ABS. IMM. GRANS. 0.0 0.00 - 0.04 K/UL    DF AUTOMATED     METABOLIC PANEL, COMPREHENSIVE    Collection Time: 01/01/23  4:56 PM   Result Value Ref Range    Sodium 137 136 - 145 mmol/L    Potassium 3.6 3.5 - 5.1 mmol/L    Chloride 104 97 - 108 mmol/L    CO2 25 21 - 32 mmol/L    Anion gap 8 5 - 15 mmol/L    Glucose 111 (H) 65 - 100 mg/dL    BUN 9 6 - 20 MG/DL    Creatinine 0.60 0.55 - 1.02 MG/DL    BUN/Creatinine ratio 15 12 - 20      eGFR >60 >60 ml/min/1.73m2    Calcium 8.7 8.5 - 10.1 MG/DL    Bilirubin, total 0.5 0.2 - 1.0 MG/DL    ALT (SGPT) 54 12 - 78 U/L    AST (SGOT) 38 (H) 15 - 37 U/L    Alk.  phosphatase 95 45 - 117 U/L    Protein, total 6.5 6.4 - 8.2 g/dL    Albumin 3.0 (L) 3.5 - 5.0 g/dL    Globulin 3.5 2.0 - 4.0 g/dL    A-G Ratio 0.9 (L) 1.1 - 2.2         Radiologic Studies -   XR CHEST PORT   Final Result      No acute process on portable chest.           CT Results  (Last 48 hours)      None          CXR Results  (Last 48 hours)                 01/01/23 1709  XR CHEST PORT Final result    Impression:      No acute process on portable chest.           Narrative:  EXAM:  XR CHEST PORT       INDICATION: Nausea and vomiting       COMPARISON: 2019       TECHNIQUE: portable chest AP view at 1701 hours       FINDINGS: The cardiac silhouette is within normal limits. The pulmonary   vasculature is within normal limits. The lungs and pleural spaces are clear. The visualized bones and upper abdomen   are age-appropriate. Medical Decision Making   I am the first provider for this patient. I reviewed the vital signs, available nursing notes, past medical history, past surgical history, family history and social history. Vital Signs-Reviewed the patient's vital signs. Patient Vitals for the past 12 hrs:   Temp Pulse Resp BP SpO2   01/01/23 2100 -- 89 16 138/73 97 %   01/01/23 2054 -- 87 16 (!) 141/68 96 %   01/01/23 2019 -- 90 16 -- 97 %   01/01/23 2004 -- 85 -- -- 96 %   01/01/23 1934 -- 96 -- -- 96 %   01/01/23 1904 -- 83 -- -- 96 %   01/01/23 1834 -- 92 -- (!) 142/62 96 %   01/01/23 1804 -- 98 -- 130/70 99 %   01/01/23 1734 -- 93 -- 133/85 98 %   01/01/23 1634 98.2 °F (36.8 °C) (!) 108 18 (!) 140/81 98 %       Records Reviewed: Nursing Notes, Previous Radiology Studies, and Previous Laboratory Studies    Provider Notes (Medical Decision Making):   Patient presenting to the emergency department with chief complaint of nausea and vomiting in the setting of being diagnosed with COVID. On exam she is hemodynamically stable, vital signs stable, her neurologic exam is intact.   She denying any chest pain or shortness of breath. She is denying any abdominal pain. Low suspicion for bacterial infection to include UTI, pneumonia. Consider viral syndrome, COVID versus flu. Low suspicion for ICH given no trauma, no blood thinners, neurologic exam is intact. Likely related to COVID infection. Will treat with Zofran fluids and reassess. If symptoms unresolved or worsening, will escalate to include additional imaging to include CT of the abdomen to assess for etiology such as obstruction, appendicitis. Will obtain basic labs to assess for electrolyte abnormality and to assess kidney function as well as anemia. Medications Administered       diphenhydrAMINE (BENADRYL) injection 12.5 mg       Admin Date  01/01/2023 Action  Given Dose  12.5 mg Route  IntraVENous Administered By  Wenona Meigs, RN              ketorolac (TORADOL) injection 15 mg       Admin Date  01/01/2023 Action  Given Dose  15 mg Route  IntraVENous Administered By  Eugenio Hodges RN              ondansetron [facility]) injection 4 mg       Admin Date  01/01/2023 Action  Given Dose  4 mg Route  IntraVENous Administered By  rEick Velasquez RN              prochlorperazine (COMPAZINE) injection 10 mg       Admin Date  01/01/2023 Action  Given Dose  10 mg Route  IntraVENous Administered By  Wenona Meigs, RN              sodium chloride 0.9 % bolus infusion 1,000 mL       Admin Date  01/01/2023 Action  New Bag Dose  1,000 mL Route  IntraVENous Administered By  Erick Velasquez RN                      ED Course:   Initial assessment performed. The patients presenting problems have been discussed, and they are in agreement with the care plan formulated and outlined with them. I have encouraged them to ask questions as they arise throughout their visit. ED Course as of 01/01/23 2224   Génesis Christensen Jan 01, 2023 2052 Patient tolerated p.o. Requested additional medication for migraine. Will discharge home with migraine cocktail.  [NM]      ED Course User Index  [NM] Michael Norwood DO       Disposition:    DC- Adult Discharges: All of the diagnostic tests were reviewed and questions answered. Diagnosis, care plan and treatment options were discussed. The patient understands the instructions and will follow up as directed. The patients results have been reviewed with them. They have been counseled regarding their diagnosis. The patient verbally convey understanding and agreement of the signs, symptoms, diagnosis, treatment and prognosis and additionally agrees to follow up as recommended with their PCP in 24 - 48 hours. They also agree with the care-plan and convey that all of their questions have been answered. I have also put together some discharge instructions for them that include: 1) educational information regarding their diagnosis, 2) how to care for their diagnosis at home, as well a 3) list of reasons why they would want to return to the ED prior to their follow-up appointment, should their condition change. DC-The patient was given verbal follow-up instructions      DISCHARGE PLAN:  1. Current Discharge Medication List        START taking these medications    Details   ondansetron hcl (Zofran) 4 mg tablet Take 1 Tablet by mouth every eight (8) hours as needed for Nausea or Vomiting. Qty: 20 Tablet, Refills: 0  Start date: 1/1/2023           2. Follow-up Information       Follow up With Specialties Details Why Contact Info    Mar Prado NP Nurse Practitioner Schedule an appointment as soon as possible for a visit in 3 days  [place] [address]  [phone]      [facility] EMERGENCY DEPT Emergency Medicine Go to  If symptoms worsen [address] [place] [address]  [phone]          3. Return to ED if worse     Diagnosis     Clinical Impression:   1.  COVID-19        Attestations:    Stephanie Quiles DO        Please note that this dictation was completed with [employer], the computer voice recognition software. Quite often unanticipated grammatical, syntax, homophones, and other interpretive errors are inadvertently transcribed by the computer software. Please disregard these errors. Please excuse any errors that have escaped final proofreading. Thank you.

## 2023-01-02 NOTE — ED NOTES
Patient ambulated to the bathroom--gait steady    Reports headache is not any better    No problems with PO challenge.

## 2023-02-07 RX ORDER — METOPROLOL SUCCINATE 25 MG/1
TABLET, EXTENDED RELEASE ORAL
Qty: 30 TABLET | Refills: 0 | Status: SHIPPED | OUTPATIENT
Start: 2023-02-07

## 2023-02-07 RX ORDER — METOPROLOL SUCCINATE 50 MG/1
TABLET, EXTENDED RELEASE ORAL
Qty: 30 TABLET | Refills: 0 | Status: SHIPPED | OUTPATIENT
Start: 2023-02-07

## 2023-02-07 NOTE — TELEPHONE ENCOUNTER
PCP: Hira Rendon NP    Last appt: 3/3/22  No future appointments. Requested Prescriptions     Signed Prescriptions Disp Refills    metoprolol succinate (TOPROL-XL) 50 mg XL tablet 30 Tablet 0     Sig: TAKE ONE TABLET BY MOUTH EVERY DAY - WITH THE 25MG FOR TOTAL OF 75MG. APPT required prior to future refills. Authorizing Provider: Kennedi Moreno     Ordering User: THAO AGUIRRE    metoprolol succinate (TOPROL-XL) 25 mg XL tablet 30 Tablet 0     Sig: TAKE ONE TABLET BY MOUTH EVERY DAY , WITH THE 50MG - TOTAL OF 75 MG DAILY. APPT required prior to future refills. Authorizing Provider: Kennedi Moreno     Ordering User: Lubna LEE         Other Comments:  Verbal order per provider. Order (medication, dose, route, frequency, amount, refills) repeated and verified twice. *APPT required prior to future refills.

## 2023-05-01 ENCOUNTER — TELEPHONE (OUTPATIENT)
Dept: CARDIOLOGY CLINIC | Age: 58
End: 2023-05-01

## 2023-05-01 ENCOUNTER — OFFICE VISIT (OUTPATIENT)
Dept: CARDIOLOGY CLINIC | Age: 58
End: 2023-05-01
Payer: COMMERCIAL

## 2023-05-01 VITALS
SYSTOLIC BLOOD PRESSURE: 118 MMHG | WEIGHT: 233.5 LBS | HEART RATE: 70 BPM | DIASTOLIC BLOOD PRESSURE: 64 MMHG | HEIGHT: 66 IN | OXYGEN SATURATION: 97 % | BODY MASS INDEX: 37.52 KG/M2

## 2023-05-01 DIAGNOSIS — I48.0 PAF (PAROXYSMAL ATRIAL FIBRILLATION) (HCC): Primary | ICD-10-CM

## 2023-05-01 DIAGNOSIS — M06.9 RHEUMATOID ARTHRITIS, INVOLVING UNSPECIFIED SITE, UNSPECIFIED WHETHER RHEUMATOID FACTOR PRESENT (HCC): ICD-10-CM

## 2023-05-01 DIAGNOSIS — I34.0 MITRAL VALVE INSUFFICIENCY, UNSPECIFIED ETIOLOGY: ICD-10-CM

## 2023-05-01 DIAGNOSIS — R00.2 PALPITATIONS: ICD-10-CM

## 2023-05-01 DIAGNOSIS — R07.9 CHEST PAIN, UNSPECIFIED TYPE: ICD-10-CM

## 2023-05-01 PROCEDURE — 93000 ELECTROCARDIOGRAM COMPLETE: CPT | Performed by: INTERNAL MEDICINE

## 2023-05-01 PROCEDURE — 99214 OFFICE O/P EST MOD 30 MIN: CPT | Performed by: INTERNAL MEDICINE

## 2023-05-01 RX ORDER — ADALIMUMAB 40MG/0.4ML
40 KIT SUBCUTANEOUS EVERY 2 WEEKS
COMMUNITY

## 2023-05-01 NOTE — PATIENT INSTRUCTIONS
Your physician has ordered a 2 weeks Event monitor that will be arriving to the address on file in a week, please follow instructions inside box. Please call Central scheduling at [phone] to schedule your Echo Stress,    Please wear comfortable clothing (shorts or pants with a shirt or blouse) and walking/athletic shoes.   Do not eat or drink anything, except water, for at least 2 hours prior to your test.  Do not take your Metoprolol prior to your test.     If Stress Echo comes back negative, then please call central scheduling to schedule test.

## 2023-05-01 NOTE — TELEPHONE ENCOUNTER
Enrolled with [employer] - Ordered and being shipped to patient's home address on file. ETA within 5-7 business days. Message  Received: Today  Oralia Urban has ordered a 2 weeks Event Monitor for palpitations for above patient. Thank you ladies.

## 2023-05-01 NOTE — PROGRESS NOTES
[address], [address] [place] [address]  [phone]    [address]      Subjective:      Gricelda Lloyd is a 59 y.o. female is here for routine f/u. The patient was last seen by us 3/3/2022. This past winter, the patient noted some nocturnal chest discomfort on several occasions that would last for about 15 minutes at a time, usually when lying down. No exertional chest discomfort. She is noticing what she considers to be panic attacks at night where it feels like her heart is coming out of her chest.  Rapid heartbeat. Today, the patient denies shortness of breath, orthopnea, PND, LE edema, syncope, or presyncope.        Patient Active Problem List    Diagnosis Date Noted    Osteoarthritis of left knee 08/11/2021    DJD (degenerative joint disease) of knee 05/10/2011      Rula Garcia NP  Past Medical History:   Diagnosis Date    Arrhythmia 2021    svt, afib    Atrial fibrillation (HCC)     Cancer ([facility]     basal skin cancer, face    GERD (gastroesophageal reflux disease)     Hypotension     Nausea & vomiting     ostearthritis     RHA (rheumatoid arthritis) ([facility]       Past Surgical History:   Procedure Laterality Date    HX APPENDECTOMY      HX CHOLECYSTECTOMY      1 year ago    HX HYSTERECTOMY      HX ORTHOPAEDIC      bi-lateral pins to knees    HX TONSILLECTOMY      HX WISDOM TEETH EXTRACTION      AK TOTAL KNEE ARTHROPLASTY  5/9/2011    Right     Allergies   Allergen Reactions    Morphine Shortness of Breath      Family History   Problem Relation Age of Onset    Other Mother         tachycardia    Stroke Father     Heart Failure Father 80    Microhematuria Maternal Grandfather 64      Social History     Socioeconomic History    Marital status: SINGLE     Spouse name: Not on file    Number of children: Not on file    Years of education: Not on file    Highest education level: Not on file   Occupational History    Not on file   Tobacco Use    Smoking status: Never    Smokeless tobacco: Never   Vaping Use    Vaping Use: Never used   Substance and Sexual Activity    Alcohol use: Yes     Alcohol/week: 1.0 standard drink     Types: 1 Glasses of wine per week     Comment: occastional    Drug use: Never    Sexual activity: Not Currently     Birth control/protection: Surgical   Other Topics Concern    Not on file   Social History Narrative    Not on file     Social Determinants of Health     Financial Resource Strain: Not on file   Food Insecurity: Not on file   Transportation Needs: Not on file   Physical Activity: Not on file   Stress: Not on file   Social Connections: Not on file   Intimate Partner Violence: Not on file   Housing Stability: Not on file      Current Outpatient Medications   Medication Sig    adalimumab (Humira,CF,) 40 mg/0.4 mL sykt 40 mg by SubCUTAneous route Once every 2 weeks. metoprolol succinate (TOPROL-XL) 50 mg XL tablet TAKE ONE TABLET BY MOUTH EVERY DAY - WITH THE 25MG FOR TOTAL OF 75MG. APPT required prior to future refills. metoprolol succinate (TOPROL-XL) 25 mg XL tablet TAKE ONE TABLET BY MOUTH EVERY DAY , WITH THE 50MG - TOTAL OF 75 MG DAILY. APPT required prior to future refills. ondansetron hcl (Zofran) 4 mg tablet Take 1 Tablet by mouth every eight (8) hours as needed for Nausea or Vomiting. folic acid (FOLVITE) 1 mg tablet Take 1 Tablet by mouth daily. methotrexate (RHEUMATREX) 2.5 mg tablet Take 1 Tablet by mouth every Wednesday. Wednesdays    melatonin 5 mg cap capsule Take 1 Capsule by mouth nightly as needed (for sleep aid). acetaminophen (TYLENOL) 500 mg tablet Take 1,000 mg by mouth every six (6) hours as needed for Pain. (Patient not taking: Reported on 5/1/2023)    etanercept (EnbreL SureClick) 50 mg/mL (1 mL) injection 50 mg every Sunday. Sundays (Patient not taking: Reported on 5/1/2023)     No current facility-administered medications for this visit.          Review of Symptoms:  11 systems reviewed, negative other than as stated in the HPI    Physical ExamPhysical Exam:    Vitals:    05/01/23 0906   BP: 118/64   Pulse: 70   SpO2: 97%   Weight: 233 lb 8 oz (105.9 kg)   Height: 5' 6\" (1.676 m)     Body mass index is 37.69 kg/m². General PE  Gen:  NAD  Mental Status - Alert. General Appearance - Not in acute distress. HEENT:  PERRL, no carotid bruits or JVD  Chest and Lung Exam   Inspection: Accessory muscles - No use of accessory muscles in breathing. Auscultation:   Breath sounds: - Normal.   Cardiovascular   Inspection: Jugular vein - Bilateral - Inspection Normal.   Palpation/Percussion:   Apical Impulse: - Normal.   Auscultation: Rhythm - Regular. Heart Sounds - S1 WNL and S2 WNL. No S3 or S4. Murmurs & Other Heart Sounds: Auscultation of the heart reveals - No Murmurs. Peripheral Vascular   Upper Extremity: Inspection - Bilateral - No Cyanotic nailbeds or Digital clubbing. Lower Extremity:   Palpation: Edema - Bilateral - No edema. Abdomen:   Soft, non-tender, bowel sounds are active. Neuro: A&O times 3, CN and motor grossly WNL    Labs:   No results found for: CHOL, CHOLX, CHLST, CHOLV, 658625, HDL, HDLP, LDL, LDLC, DLDLP, TGLX, TRIGL, TRIGP, CHHD, CHHDX  No results found for: CPK, CPKX, CPX  Lab Results   Component Value Date/Time    Sodium 137 01/01/2023 04:56 PM    Potassium 3.6 01/01/2023 04:56 PM    Chloride 104 01/01/2023 04:56 PM    CO2 25 01/01/2023 04:56 PM    Anion gap 8 01/01/2023 04:56 PM    Glucose 111 (H) 01/01/2023 04:56 PM    BUN 9 01/01/2023 04:56 PM    Creatinine 0.60 01/01/2023 04:56 PM    BUN/Creatinine ratio 15 01/01/2023 04:56 PM    GFR est  11/12/2021 11:46 AM    GFR est non-AA 98 11/12/2021 11:46 AM    Calcium 8.7 01/01/2023 04:56 PM    Bilirubin, total 0.5 01/01/2023 04:56 PM    Alk.  phosphatase 95 01/01/2023 04:56 PM    Protein, total 6.5 01/01/2023 04:56 PM    Albumin 3.0 (L) 01/01/2023 04:56 PM    Globulin 3.5 01/01/2023 04:56 PM    A-G Ratio 0.9 (L) 01/01/2023 04:56 PM    ALT (SGPT) 54 2023 04:56 PM       EKG:  NSR     21    ECHO ADULT COMPLETE 2021    Interpretation Summary  · LV: Estimated LVEF is 55 - 60%. Normal cavity size, wall thickness, systolic function (ejection fraction normal) and diastolic function. Wall motion: normal.  · MV: Mild mitral valve regurgitation is present. Signed by: Jeffrey Grewal MD on 2021 10:55 PM           Assessment:          ICD-10-CM ICD-9-CM    1. PAF (paroxysmal atrial fibrillation) (HCC)  I48.0 427.31 AMB POC EKG ROUTINE W/ 12 LEADS, INTER & REP      ECHO STRESS      CT HEART W/O CONT WITH CALCIUM      2. Chest pain, unspecified type  R07.9 786.50 ECHO STRESS      CT HEART W/O CONT WITH CALCIUM      3. Palpitations  R00.2 785.1 ECHO STRESS      CT HEART W/O CONT WITH CALCIUM      4. Mitral valve insufficiency, unspecified etiology  I34.0 424.0       5. Rheumatoid arthritis, involving unspecified site, unspecified whether rheumatoid factor present ([facility]  M06.9 714.0           Orders Placed This Encounter    CT HEART W/O CONT WITH CALCIUM     Standing Status:   Future     Standing Expiration Date:   2024     Order Specific Question:   Reason for Exam     Answer:   chest pain, palpitations    AMB POC EKG ROUTINE W/ 12 LEADS, INTER & REP     Order Specific Question:   Reason for Exam:     Answer:   routine    adalimumab (Humira,CF,) 40 mg/0.4 mL sykt     Si mg by SubCUTAneous route Once every 2 weeks.         Plan:     PAF (paroxysmal atrial fibrillation) (HCC)  Increased palpitation symptoms last OV with normal  CBC, thyroid panel and CMP in 2021  2-week event monitor done 2021 with no AF, but 1 episode of SVT  Echo done 2021 with preserved LVEF 55-60%, mild MR  Continue Toprol 50 mg in AM and 25 mg in PM; if HR remain > 100 bpm, and BP > 367 systolic, then can increase Toprol to 50 mg BID  Hold ASA due to low [id number] and no recent evidence of AF  Repeat 2-week event monitor now due to history of SVT/PAF that she attributes to \"panic attacks\" occurring at night with rapid heartbeats  If breakthrough arrhythmia despite significant dose of Toprol, refer to electrophysiology to consider medical versus ablation options     Hx atypical cp last episode was in 11/19, occurred while sitting down and lasted about 10 minutes and went away  Normal stress test 9/2020. Denies further CP. No fam hx of CAD/MI. Repeat stress echo now due to chest discomfort, nonspecific ST and T wave changes (abnormal EKG). If stress echo is normal, Coronary calcium scoring would be reassuring if totally normal and threshold for further testing/treatment would be decreased if high- the patient wishes to proceed.    Ordered to be scheduled by radiology after stress test is verified to be normal.      RA  On biologic therapy, followed by Dr Michelet Davis current care and f/u in 6 months, sooner if testing is abnormal       Esau Dueñas MD

## 2023-05-05 ENCOUNTER — TRANSCRIBE ORDERS (OUTPATIENT)
Facility: HOSPITAL | Age: 58
End: 2023-05-05

## 2023-05-05 DIAGNOSIS — R00.2 PALPITATIONS: ICD-10-CM

## 2023-05-05 DIAGNOSIS — I48.0 PAF (PAROXYSMAL ATRIAL FIBRILLATION) (HCC): Primary | ICD-10-CM

## 2023-05-05 DIAGNOSIS — R07.9 CHEST PAIN, UNSPECIFIED TYPE: ICD-10-CM

## 2023-05-10 RX ORDER — METOPROLOL SUCCINATE 25 MG/1
TABLET, EXTENDED RELEASE ORAL
Qty: 90 TABLET | Refills: 1 | Status: SHIPPED | OUTPATIENT
Start: 2023-05-10

## 2023-05-10 NOTE — TELEPHONE ENCOUNTER
Per dr. Fam Alvarado on 05-. \"Continue Toprol 50 mg in AM and 25 mg in PM; if HR remain > 100 bpm, and BP > 861 systolic, then can increase Toprol to 50 mg BID\".

## 2023-05-18 ENCOUNTER — HOSPITAL ENCOUNTER (OUTPATIENT)
Facility: HOSPITAL | Age: 58
Discharge: HOME | End: 2023-05-20
Payer: COMMERCIAL

## 2023-05-18 DIAGNOSIS — R00.2 PALPITATIONS: ICD-10-CM

## 2023-05-18 DIAGNOSIS — I48.0 PAF (PAROXYSMAL ATRIAL FIBRILLATION) (HCC): ICD-10-CM

## 2023-05-18 DIAGNOSIS — R07.9 CHEST PAIN, UNSPECIFIED TYPE: ICD-10-CM

## 2023-05-18 LAB
STRESS ANGINA INDEX: 0
STRESS BASELINE DIAS BP: 63 MMHG
STRESS BASELINE ST DEPRESSION: 0 MM
STRESS BASELINE SYS BP: 133 MMHG
STRESS ESTIMATED WORKLOAD: 7 METS
STRESS PEAK DIAS BP: 88 MMHG
STRESS PEAK SYS BP: 161 MMHG
STRESS PERCENT HR ACHIEVED: 101 %
STRESS POST PEAK HR: 162 BPM
STRESS RATE PRESSURE PRODUCT: NORMAL BPM*MMHG
STRESS STAGE 1 BP: NORMAL MMHG
STRESS STAGE 1 DURATION: NORMAL MIN:SEC
STRESS STAGE 1 HR: 137 BPM
STRESS STAGE 2 DURATION: NORMAL MIN:SEC
STRESS STAGE 2 HR: 157 BPM
STRESS STAGE RECOVERY 1 BP: NORMAL MMHG
STRESS STAGE RECOVERY 1 DURATION: NORMAL MIN:SEC
STRESS STAGE RECOVERY 1 HR: 78 BPM
STRESS TARGET HR: 161 BPM

## 2023-05-18 PROCEDURE — 6360000004 HC RX CONTRAST MEDICATION: Performed by: INTERNAL MEDICINE

## 2023-05-18 PROCEDURE — 93017 CV STRESS TEST TRACING ONLY: CPT

## 2023-05-18 RX ADMIN — PERFLUTREN 1.5 ML: 6.52 INJECTION, SUSPENSION INTRAVENOUS at 10:16

## 2023-05-23 ENCOUNTER — TELEPHONE (OUTPATIENT)
Age: 58
End: 2023-05-23

## 2023-06-06 ENCOUNTER — TELEPHONE (OUTPATIENT)
Age: 58
End: 2023-06-06

## 2023-06-06 NOTE — TELEPHONE ENCOUNTER
Please advise monitor looks great. No evidence of atrial fibrillation. Occasional early heartbeats which are not dangerous. If doing well, follow-up in 6 months.

## 2023-07-14 ENCOUNTER — TELEPHONE (OUTPATIENT)
Age: 58
End: 2023-07-14

## 2023-07-14 ENCOUNTER — PATIENT MESSAGE (OUTPATIENT)
Age: 58
End: 2023-07-14

## 2023-07-14 RX ORDER — METOPROLOL SUCCINATE 50 MG/1
50 TABLET, EXTENDED RELEASE ORAL NIGHTLY
Qty: 90 TABLET | Refills: 2 | OUTPATIENT
Start: 2023-07-14

## 2023-07-14 RX ORDER — METOPROLOL SUCCINATE 25 MG/1
25 TABLET, EXTENDED RELEASE ORAL DAILY
Qty: 90 TABLET | Refills: 2 | Status: SHIPPED | OUTPATIENT
Start: 2023-07-14

## 2023-07-14 RX ORDER — METOPROLOL SUCCINATE 25 MG/1
TABLET, EXTENDED RELEASE ORAL
Qty: 90 TABLET | Refills: 0 | OUTPATIENT
Start: 2023-07-14

## 2023-07-14 NOTE — TELEPHONE ENCOUNTER
Refill Request Received for the Following Medication     Requested Prescriptions     Signed Prescriptions Disp Refills    metoprolol succinate (TOPROL XL) 25 MG extended release tablet 90 tablet 2     Sig: Take 1 tablet by mouth daily     Authorizing Provider: Cecily Tomas     Ordering User: Ayah Carranza     Refused Prescriptions Disp Refills    metoprolol succinate (TOPROL XL) 50 MG extended release tablet 90 tablet 2     Sig: Take 1 tablet by mouth nightly     Refused By: Ayah Carranza     Reason for Refusal: Patient has requested refill too soon       Last Prescribed:02-    Last Appointment With Me:  5/1/2023     Future Appointments:  Future Appointments   Date Time Provider [address]   11/6/2023 11:20 AM MD [facility] AMB

## 2023-07-18 RX ORDER — METOPROLOL SUCCINATE 50 MG/1
TABLET, EXTENDED RELEASE ORAL
Qty: 90 TABLET | Refills: 0 | OUTPATIENT
Start: 2023-07-18

## 2023-07-18 NOTE — TELEPHONE ENCOUNTER
Medication rx refused. Previously ordered on 7/14/23.   Requested Prescriptions     Refused Prescriptions Disp Refills    metoprolol succinate (TOPROL XL) 50 MG extended release tablet [Pharmacy Med Name: METOPROLOL SUCC ER 50 MG TAB] 90 tablet 0     Sig: TAKE ONE TABLET BY MOUTH EVERY DAY - WITH THE 25MG FOR TOTAL OF 75MG     Refused By: Yajaira Lazar     Reason for Refusal: Patient has requested refill too soon

## 2023-07-19 RX ORDER — METOPROLOL SUCCINATE 50 MG/1
50 TABLET, EXTENDED RELEASE ORAL DAILY
Qty: 90 TABLET | Refills: 0 | Status: SHIPPED | OUTPATIENT
Start: 2023-07-19

## 2023-08-06 RX ORDER — METOPROLOL SUCCINATE 25 MG/1
TABLET, EXTENDED RELEASE ORAL
Qty: 90 TABLET | Refills: 0 | OUTPATIENT
Start: 2023-08-06

## 2023-08-06 RX ORDER — METOPROLOL SUCCINATE 50 MG/1
TABLET, EXTENDED RELEASE ORAL
Qty: 90 TABLET | Refills: 0 | OUTPATIENT
Start: 2023-08-06

## 2023-10-16 RX ORDER — METOPROLOL SUCCINATE 50 MG/1
50 TABLET, EXTENDED RELEASE ORAL DAILY
Qty: 90 TABLET | Refills: 2 | Status: SHIPPED | OUTPATIENT
Start: 2023-10-16

## 2023-10-16 NOTE — TELEPHONE ENCOUNTER
Refill Request Rec-eived for the Following Medication     Requested Prescriptions     Pending Prescriptions Disp Refills    metoprolol succinate (TOPROL XL) 50 MG extended release tablet [Pharmacy Med Name: METOPROLOL SUCC ER 50 MG TAB] 90 tablet 1     Sig: TAKE ONE TABLET BY MOUTH EVERY DAY       -Last Prescribed: 07-19-23    Last Appointment With Me:  5/1/2023     Future Appointments:  Future Appointments   Date Time Provider [address]   11/6/2023 11:20 AM MD [facility] AMB

## 2023-11-06 ENCOUNTER — OFFICE VISIT (OUTPATIENT)
Age: 58
End: 2023-11-06
Payer: COMMERCIAL

## 2023-11-06 VITALS
HEIGHT: 66 IN | SYSTOLIC BLOOD PRESSURE: 124 MMHG | BODY MASS INDEX: 35.84 KG/M2 | WEIGHT: 223 LBS | DIASTOLIC BLOOD PRESSURE: 78 MMHG | RESPIRATION RATE: 18 BRPM | OXYGEN SATURATION: 100 % | HEART RATE: 62 BPM

## 2023-11-06 DIAGNOSIS — M06.9 RHEUMATOID ARTHRITIS, INVOLVING UNSPECIFIED SITE, UNSPECIFIED WHETHER RHEUMATOID FACTOR PRESENT (HCC): ICD-10-CM

## 2023-11-06 DIAGNOSIS — I48.0 PAF (PAROXYSMAL ATRIAL FIBRILLATION) (HCC): Primary | ICD-10-CM

## 2023-11-06 DIAGNOSIS — R00.2 PALPITATIONS: ICD-10-CM

## 2023-11-06 DIAGNOSIS — R07.2 PRECORDIAL PAIN: ICD-10-CM

## 2023-11-06 PROCEDURE — 99214 OFFICE O/P EST MOD 30 MIN: CPT | Performed by: INTERNAL MEDICINE

## 2023-11-06 PROCEDURE — 93000 ELECTROCARDIOGRAM COMPLETE: CPT | Performed by: INTERNAL MEDICINE

## 2023-11-06 RX ORDER — ALPRAZOLAM 0.25 MG/1
TABLET ORAL
COMMUNITY
Start: 2023-07-28

## 2023-11-06 RX ORDER — IBUPROFEN 800 MG/1
800 TABLET ORAL 3 TIMES DAILY PRN
COMMUNITY
Start: 2021-04-01

## 2023-11-06 RX ORDER — PREDNISONE 1 MG/1
TABLET ORAL AS NEEDED
COMMUNITY

## 2023-11-06 RX ORDER — ADALIMUMAB 40MG/0.4ML
KIT SUBCUTANEOUS
COMMUNITY
Start: 2023-09-18

## 2023-11-06 RX ORDER — ESTRADIOL 0.1 MG/G
CREAM VAGINAL
COMMUNITY
Start: 2023-05-15

## 2023-11-06 RX ORDER — ATORVASTATIN CALCIUM 10 MG/1
10 TABLET, FILM COATED ORAL DAILY
COMMUNITY

## 2023-11-06 RX ORDER — CYCLOBENZAPRINE HCL 10 MG
TABLET ORAL
COMMUNITY
Start: 2023-09-20

## 2023-11-06 ASSESSMENT — PATIENT HEALTH QUESTIONNAIRE - PHQ9
SUM OF ALL RESPONSES TO PHQ QUESTIONS 1-9: 0
SUM OF ALL RESPONSES TO PHQ QUESTIONS 1-9: 0
2. FEELING DOWN, DEPRESSED OR HOPELESS: 0
1. LITTLE INTEREST OR PLEASURE IN DOING THINGS: 0
SUM OF ALL RESPONSES TO PHQ QUESTIONS 1-9: 0
SUM OF ALL RESPONSES TO PHQ9 QUESTIONS 1 & 2: 0
SUM OF ALL RESPONSES TO PHQ QUESTIONS 1-9: 0

## 2023-12-05 ENCOUNTER — HOSPITAL ENCOUNTER (OUTPATIENT)
Facility: HOSPITAL | Age: 58
Discharge: HOME | End: 2023-12-08
Attending: INTERNAL MEDICINE

## 2023-12-05 DIAGNOSIS — I48.0 PAF (PAROXYSMAL ATRIAL FIBRILLATION) (HCC): ICD-10-CM

## 2023-12-05 DIAGNOSIS — R00.2 PALPITATIONS: ICD-10-CM

## 2023-12-05 DIAGNOSIS — R07.9 CHEST PAIN, UNSPECIFIED TYPE: ICD-10-CM

## 2023-12-05 PROCEDURE — 75571 CT HRT W/O DYE W/CA TEST: CPT

## 2023-12-13 ENCOUNTER — TELEPHONE (OUTPATIENT)
Age: 58
End: 2023-12-13

## 2023-12-13 NOTE — TELEPHONE ENCOUNTER
----- Message from Bharti Gimenez MD sent at 12/11/2023  7:48 AM EST -----  Please advise coronary calcium score is very low, meaning that there is barely any cholesterol plaque in the arteries of the heart. That is great news. Continue with healthy diet and exercise and current medications. This should be very comforting to her, and would predict a very low risk for heart attack.

## 2023-12-13 NOTE — TELEPHONE ENCOUNTER
----- Message from Marleni Garcia MD sent at 12/11/2023  7:48 AM EST -----  Please advise coronary calcium score is very low, meaning that there is barely any cholesterol plaque in the arteries of the heart. That is great news. Continue with healthy diet and exercise and current medications. This should be very comforting to her, and would predict a very low risk for heart attack. Left message for patient to call us back.

## 2024-04-11 ENCOUNTER — TELEPHONE (OUTPATIENT)
Age: 59
End: 2024-04-11

## 2024-04-11 RX ORDER — METOPROLOL SUCCINATE 25 MG/1
25 TABLET, EXTENDED RELEASE ORAL EVERY EVENING
Qty: 30 TABLET | Refills: 1 | Status: SHIPPED | OUTPATIENT
Start: 2024-04-11

## 2024-04-11 NOTE — TELEPHONE ENCOUNTER
Spoke with patient, verified with 2 identifiers. Ms Echols stated that she is taking  Metoprolol 50 mg in the morning and 25 mg in the afternoon and she feels fine with it. \"I don't feel anything, not irregular Heart beats or palpitation\"  patient request to keep it 50 mg of Metoprolol en the am and 25 mg in the evening and if she feels the necessity to change to 50 mg bid, will call us back stated.

## 2024-04-11 NOTE — TELEPHONE ENCOUNTER
Refill Request Received for the Following Medication   Per dr. Escobar office notes on 11-  \"Continue Toprol 50 mg in AM and 25 mg in PM; if HR remain > 100 bpm, and BP > 100 systolic, then can increase Toprol to 50 mg BID\"    Requested Prescriptions     Pending Prescriptions Disp Refills    metoprolol succinate (TOPROL XL) 25 MG extended release tablet       Sig: Take 1 tablet by mouth daily 50mg in am 25mg at pm       Last Prescribed: 07-    Last Appointment With Me:  11/6/2023     Future Appointments:  Future Appointments   Date Time Provider Department Center   11/11/2024 11:40 AM Tod Davis MD [facility]

## 2024-06-07 RX ORDER — METOPROLOL SUCCINATE 25 MG/1
TABLET, EXTENDED RELEASE ORAL
Qty: 30 TABLET | Refills: 5 | Status: SHIPPED | OUTPATIENT
Start: 2024-06-07

## 2024-06-07 RX ORDER — METOPROLOL SUCCINATE 50 MG/1
TABLET, EXTENDED RELEASE ORAL
Qty: 30 TABLET | Refills: 5 | Status: SHIPPED | OUTPATIENT
Start: 2024-06-07

## 2024-06-07 NOTE — TELEPHONE ENCOUNTER
Per dr. Escobar office notes on 11-  \"Continue Toprol 50 mg in AM and 25 mg in PM; if HR remain > 100 bpm, and BP > 100 systolic, then can increase Toprol to 50 mg BID\"  Refill Request Received for the Following Medication     Requested Prescriptions     Signed Prescriptions Disp Refills    metoprolol succinate (TOPROL XL) 25 MG extended release tablet 30 tablet 5     Sig: Take 50 mg  in the morning and 25 mg at night time.     Authorizing Provider: ROBERT DAVIS     Ordering User: FRANCISCO ALDRICH    metoprolol succinate (TOPROL XL) 50 MG extended release tablet 30 tablet 5     Sig: Take 50 mg in the morning and 25 mg at night time.     Authorizing Provider: ROBERT DAVIS     Ordering User: FRANCISCO ALDRICH       Last Prescribed:    Last Appointment With Me:  11/6/2023     Future Appointments:  Future Appointments   Date Time Provider Department Center   11/11/2024 11:40 AM Robert Davis MD [facility]

## 2024-06-10 RX ORDER — METOPROLOL SUCCINATE 25 MG/1
TABLET, EXTENDED RELEASE ORAL
Qty: 30 TABLET | Refills: 0 | OUTPATIENT
Start: 2024-06-10

## 2024-06-17 RX ORDER — METOPROLOL SUCCINATE 25 MG/1
TABLET, EXTENDED RELEASE ORAL
Qty: 30 TABLET | Refills: 0 | OUTPATIENT
Start: 2024-06-17

## 2024-06-17 NOTE — TELEPHONE ENCOUNTER
Refill Request Received for the Following Medication     Requested Prescriptions     Pending Prescriptions Disp Refills    metoprolol succinate (TOPROL XL) 25 MG extended release tablet [Pharmacy Med Name: METOPROLOL SUCC ER 25 MGTAB] 30 tablet 0     Sig: TAKE ONE TABLET BY MOUTH EVERY EVENING     Last Appointment With Me:  11/6/2023     Future Appointments:  Future Appointments   Date Time Provider Department Center   11/11/2024 11:40 AM Tod Davis MD [facility]      Already refilled

## 2024-10-28 RX ORDER — METOPROLOL SUCCINATE 50 MG/1
TABLET, EXTENDED RELEASE ORAL
Qty: 30 TABLET | Refills: 0 | OUTPATIENT
Start: 2024-10-28

## 2024-11-05 ENCOUNTER — OFFICE VISIT (OUTPATIENT)
Age: 59
End: 2024-11-05
Payer: COMMERCIAL

## 2024-11-05 VITALS
HEIGHT: 66 IN | HEART RATE: 75 BPM | OXYGEN SATURATION: 97 % | SYSTOLIC BLOOD PRESSURE: 120 MMHG | WEIGHT: 237 LBS | BODY MASS INDEX: 38.09 KG/M2 | RESPIRATION RATE: 16 BRPM | DIASTOLIC BLOOD PRESSURE: 60 MMHG

## 2024-11-05 DIAGNOSIS — R00.2 PALPITATIONS: ICD-10-CM

## 2024-11-05 DIAGNOSIS — I48.0 PAF (PAROXYSMAL ATRIAL FIBRILLATION) (HCC): ICD-10-CM

## 2024-11-05 DIAGNOSIS — I34.0 NONRHEUMATIC MITRAL (VALVE) INSUFFICIENCY: Primary | ICD-10-CM

## 2024-11-05 DIAGNOSIS — R07.2 PRECORDIAL PAIN: ICD-10-CM

## 2024-11-05 DIAGNOSIS — I25.9 CHEST PAIN DUE TO MYOCARDIAL ISCHEMIA, UNSPECIFIED ISCHEMIC CHEST PAIN TYPE: ICD-10-CM

## 2024-11-05 DIAGNOSIS — R07.9 CHEST PAIN, UNSPECIFIED TYPE: ICD-10-CM

## 2024-11-05 PROCEDURE — 99214 OFFICE O/P EST MOD 30 MIN: CPT | Performed by: INTERNAL MEDICINE

## 2024-11-05 PROCEDURE — 93000 ELECTROCARDIOGRAM COMPLETE: CPT | Performed by: INTERNAL MEDICINE

## 2024-11-05 RX ORDER — ABATACEPT 125 MG/ML
INJECTION, SOLUTION SUBCUTANEOUS
COMMUNITY

## 2024-11-05 ASSESSMENT — PATIENT HEALTH QUESTIONNAIRE - PHQ9
1. LITTLE INTEREST OR PLEASURE IN DOING THINGS: NOT AT ALL
SUM OF ALL RESPONSES TO PHQ QUESTIONS 1-9: 0
SUM OF ALL RESPONSES TO PHQ9 QUESTIONS 1 & 2: 0
SUM OF ALL RESPONSES TO PHQ QUESTIONS 1-9: 0
2. FEELING DOWN, DEPRESSED OR HOPELESS: NOT AT ALL

## 2024-11-05 NOTE — PROGRESS NOTES
[address] [place] [address] [phone]    [address] [place] [address]     Subjective:        Carol Echols is a 59 y.o. female is here for routine f/u.  The patient denies chest pain/ shortness of breath, orthopnea, PND, LE edema, palpitations, syncope, presyncope or fatigue.    Patient Active Problem List    Diagnosis Date Noted    Osteoarthritis of left knee 08/11/2021    DJD (degenerative joint disease) of knee 05/10/2011      Mariusz Xie MD  Past Medical History:   Diagnosis Date    Arrhythmia 2021    svt, afib    Atrial fibrillation (HCC)     Autoimmune disease (HCC)     Cancer (HCC)     basal skin cancer, face    GERD (gastroesophageal reflux disease)     Hypotension     Nausea & vomiting     RHA (rheumatoid arthritis) (HCC)       Past Surgical History:   Procedure Laterality Date    APPENDECTOMY      CHOLECYSTECTOMY      1 year ago    HYSTERECTOMY (CERVIX STATUS UNKNOWN)      ORTHOPEDIC SURGERY      bi-lateral pins to knees    TONSILLECTOMY      TOTAL KNEE ARTHROPLASTY  5/9/2011    Right    WISDOM TOOTH EXTRACTION       Allergies   Allergen Reactions    Morphine Shortness Of Breath     Other Reaction(s): Not available, Unknown    Ethylmorphine       Family History   Problem Relation Age of Onset    Stroke Father     Other Mother         tachycardia    Microhematuria Maternal Grandfather 56    Heart Failure Father 88      Social History     Socioeconomic History    Marital status: Single     Spouse name: Not on file    Number of children: Not on file    Years of education: Not on file    Highest education level: Not on file   Occupational History    Not on file   Tobacco Use    Smoking status: Never     Passive exposure: Never    Smokeless tobacco: Never   Substance and Sexual Activity    Alcohol use: Not Currently     Comment: occ    Drug use: Never    Sexual activity: Not on file   Other Topics Concern    Not on file   Social History Narrative

## 2024-11-06 ENCOUNTER — TELEPHONE (OUTPATIENT)
Age: 59
End: 2024-11-06

## 2024-12-10 RX ORDER — METOPROLOL SUCCINATE 25 MG/1
25 TABLET, EXTENDED RELEASE ORAL NIGHTLY
Qty: 90 TABLET | Refills: 3 | Status: SHIPPED
Start: 2024-12-10

## 2024-12-10 RX ORDER — METOPROLOL SUCCINATE 50 MG/1
TABLET, EXTENDED RELEASE ORAL
Qty: 90 TABLET | Refills: 3 | Status: SHIPPED | OUTPATIENT
Start: 2024-12-10

## 2024-12-10 NOTE — TELEPHONE ENCOUNTER
Refill Request Received for the Following Medication     Requested Prescriptions     Pending Prescriptions Disp Refills    metoprolol succinate (TOPROL XL) 50 MG extended release tablet [Pharmacy Med Name: METOPROLOL SUCC ER 50 MG TAB] 90 tablet 0     Sig: TAKE ONE TABLET BY MOUTH EVERY MORNING AND 1/2 TABLET EVERY NIGHT       Last Prescribed: 06-    Last Appointment With Me:  11/5/2024     Future Appointments:  Future Appointments   Date Time Provider Department Center   12/31/2024 11:00 AM DARREL VAZQUEZ E[facility]   11/10/2025  9:20 AM Tod Davis MD [facility]

## 2024-12-17 ENCOUNTER — TELEPHONE (OUTPATIENT)
Age: 59
End: 2024-12-17

## (undated) DEVICE — TUBING IRR HI FLO -- F/ANSPACH EMAX 2

## (undated) DEVICE — STRYKER PERFORMANCE SERIES SAGITTAL BLADE: Brand: STRYKER PERFORMANCE SERIES

## (undated) DEVICE — ZIMMER® STERILE DISPOSABLE TOURNIQUET CUFF WITH PROTECTIVE SLEEVE AND PLC, DUAL PORT, SINGLE BLADDER, 34 IN. (86 CM)

## (undated) DEVICE — BLADE SURG SAW S STL NAR OSC W/ SERR EDGE DISP

## (undated) DEVICE — WRAP LEG DEMAYO STRL -- MAKO

## (undated) DEVICE — STERILE POLYISOPRENE POWDER-FREE SURGICAL GLOVES: Brand: PROTEXIS

## (undated) DEVICE — DEVICE TRNSF SPIK STL 2008S] MICROTEK MEDICAL INC]

## (undated) DEVICE — SUTURE VCRL SZ 1 L18IN ABSRB VLT CT-1 L36MM 1/2 CIR J741D

## (undated) DEVICE — DRSG POSTOP PRMSL AG 3.5X14IN

## (undated) DEVICE — TRAP FLUID BUFFALO FLTR

## (undated) DEVICE — MARKER RAD KNEE FEM CKPT STEREOTAXIC IMAG LESION LOC

## (undated) DEVICE — BANDAGE COMPR M W6INXL10YD WHT BGE VELC E MTRX HK AND LOOP

## (undated) DEVICE — NEEDLE HYPO 18GA L1.5IN PNK S STL HUB POLYPR SHLD REG BVL

## (undated) DEVICE — HOOD: Brand: FLYTE

## (undated) DEVICE — SOLUTION IRRIG 3000ML 0.9% SOD CHL USP UROMATIC PLAS CONT

## (undated) DEVICE — SOLUTION IRRIG 1000ML STRL H2O USP PLAS POUR BTL

## (undated) DEVICE — GLOVE ORANGE PI 8 1/2   MSG9085

## (undated) DEVICE — PIN BNE FIX 4X140MM STRL -- 1EA=2PK MAKO

## (undated) DEVICE — CEMENT MIXING SYSTEM WITH FEMORAL BREAKWAY NOZZLE: Brand: REVOLUTION

## (undated) DEVICE — OPTIFOAM GENTLE SA, POSTOP, 4X12: Brand: MEDLINE

## (undated) DEVICE — 3M™ IOBAN™ 2 ANTIMICROBIAL INCISE DRAPE 6651EZ: Brand: IOBAN™ 2

## (undated) DEVICE — HANDPIECE SET WITH COAXIAL HIGH FLOW TIP AND SUCTION TUBE: Brand: INTERPULSE

## (undated) DEVICE — 450 ML BOTTLE OF 0.05% CHLORHEXIDINE GLUCONATE IN 99.95% STERILE WATER FOR IRRIGATION, USP AND APPLICATOR.: Brand: IRRISEPT ANTIMICROBIAL WOUND LAVAGE

## (undated) DEVICE — PREP SKN CHLRAPRP APL 26ML STR --

## (undated) DEVICE — BNDG ADH FABRIC 2X4IN ST LF --

## (undated) DEVICE — TOTAL JOINT-MRMC: Brand: MEDLINE INDUSTRIES, INC.

## (undated) DEVICE — SOLUTION IRRIG 1000ML 0.9% SOD CHL USP POUR PLAS BTL

## (undated) DEVICE — REM POLYHESIVE ADULT PATIENT RETURN ELECTRODE: Brand: VALLEYLAB

## (undated) DEVICE — CORD RETRCT SIL

## (undated) DEVICE — PIN BNE 4X110MM STRL -- 2/PK MAKO

## (undated) DEVICE — BLADE SURG SAW STD S STL OSC W/ SERR EDGE DISP

## (undated) DEVICE — FLOSEAL HEMOSTATIC MATRIX, 10ML: Brand: FLOSEAL HEMOSTATIC MATRIX

## (undated) DEVICE — SUTURE VCRL SZ 2-0 L18IN ABSRB UD CT-1 L36MM 1/2 CIR J839D

## (undated) DEVICE — KIT INT FIX FEM TIB CKPT MAKOPLASTY

## (undated) DEVICE — GLOVE SURG SZ 8 L12IN FNGR THK79MIL GRN LTX FREE

## (undated) DEVICE — DERMABOND SKIN ADH 0.7ML -- DERMABOND ADVANCED 12/BX

## (undated) DEVICE — KIT DRP FOR RIO ROBOTIC ARM ASST SYS

## (undated) DEVICE — KIT PROC KNE TRACKING PK/1 -- VIZADISC MAKO

## (undated) DEVICE — LABEL MED MRMC ORTH STRL